# Patient Record
Sex: FEMALE | Race: WHITE | NOT HISPANIC OR LATINO | Employment: UNEMPLOYED | URBAN - METROPOLITAN AREA
[De-identification: names, ages, dates, MRNs, and addresses within clinical notes are randomized per-mention and may not be internally consistent; named-entity substitution may affect disease eponyms.]

---

## 2017-05-29 DIAGNOSIS — R05.9 COUGH: Primary | ICD-10-CM

## 2017-07-15 ENCOUNTER — OFFICE VISIT (OUTPATIENT)
Dept: URGENT CARE | Facility: CLINIC | Age: 62
End: 2017-07-15
Payer: COMMERCIAL

## 2017-07-15 VITALS
HEART RATE: 69 BPM | TEMPERATURE: 96 F | OXYGEN SATURATION: 99 % | SYSTOLIC BLOOD PRESSURE: 123 MMHG | RESPIRATION RATE: 18 BRPM | BODY MASS INDEX: 19.99 KG/M2 | WEIGHT: 120 LBS | HEIGHT: 65 IN | DIASTOLIC BLOOD PRESSURE: 74 MMHG

## 2017-07-15 DIAGNOSIS — L03.012 PARONYCHIA, LEFT: Primary | ICD-10-CM

## 2017-07-15 PROCEDURE — 99213 OFFICE O/P EST LOW 20 MIN: CPT | Mod: S$GLB,,, | Performed by: SURGERY

## 2017-07-15 RX ORDER — MUPIROCIN 20 MG/G
OINTMENT TOPICAL 3 TIMES DAILY
Qty: 1 TUBE | Refills: 0 | Status: SHIPPED | OUTPATIENT
Start: 2017-07-15

## 2017-07-15 NOTE — PROGRESS NOTES
Subjective:       Patient ID: Evie Bertrand is a 62 y.o. female.    Chief Complaint: Laceration (Patient states she cut her left thumb one week ago.)    Laceration    The incident occurred more than 1 week ago. The laceration is located on the left hand (thumb with injur to nail). The laceration mechanism was a clean knife. The pain is moderate. The pain has been worsening (worsening pain and nodule developing corner of nail and skin) since onset. It is unknown if a foreign body is present. Her tetanus status is UTD.     Review of Systems   Constitution: Negative for chills and decreased appetite.   Endocrine: Negative for cold intolerance, polydipsia and polyuria.   Skin: Positive for color change, dry skin, nail changes and poor wound healing.        Soaked nightly with peroxide and applied bandage   Musculoskeletal: Negative for arthritis and back pain.   Psychiatric/Behavioral: Negative for altered mental status, depression, memory loss and substance abuse. The patient does not have insomnia.        Objective:      Physical Exam   Constitutional: She is oriented to person, place, and time. She appears well-developed and well-nourished. She is cooperative.  Non-toxic appearance. She does not appear ill. No distress.   HENT:   Head: Normocephalic and atraumatic.   Right Ear: Hearing, tympanic membrane and ear canal normal.   Left Ear: Hearing, tympanic membrane and ear canal normal.   Nose: Nose normal. No mucosal edema, rhinorrhea or nasal deformity. No epistaxis. Right sinus exhibits no maxillary sinus tenderness and no frontal sinus tenderness. Left sinus exhibits no maxillary sinus tenderness and no frontal sinus tenderness.   Mouth/Throat: Uvula is midline and mucous membranes are normal. No trismus in the jaw. Normal dentition. No uvula swelling. No posterior oropharyngeal erythema.   Eyes: Conjunctivae and lids are normal. Right eye exhibits no discharge. Left eye exhibits no discharge. No scleral icterus.    Sclera clear bilat   Neck: Trachea normal, normal range of motion, full passive range of motion without pain and phonation normal. Neck supple.   Cardiovascular: Normal rate, regular rhythm, normal heart sounds, intact distal pulses and normal pulses.    Pulmonary/Chest: Effort normal. No respiratory distress.   Abdominal: Soft. Normal appearance. She exhibits no distension, no pulsatile midline mass and no mass. There is no tenderness.   Musculoskeletal: Normal range of motion. She exhibits no edema or deformity.   Neurological: She is alert and oriented to person, place, and time. She exhibits normal muscle tone. Coordination normal.   Skin: Skin is warm and dry. Laceration and rash noted. Rash is nodular. She is not diaphoretic. No cyanosis. No pallor.        Psychiatric: She has a normal mood and affect. Her speech is normal and behavior is normal. Judgment and thought content normal. Cognition and memory are normal.   Nursing note and vitals reviewed.      Assessment:       1. Paronychia, left        Plan:         Paronychia, left  -     mupirocin (BACTROBAN) 2 % ointment; Apply topically 3 (three) times daily.  Dispense: 1 Tube; Refill: 0

## 2017-07-15 NOTE — PATIENT INSTRUCTIONS
Paronychia of the Finger or Toe  Paronychia is an infection near a fingernail or toenail. It usually occurs when an opening in the cuticle or an ingrown toenail lets bacteria under the skin.  The infection will need to be drained if pus is present. If the infection has been caught early, you may need only antibiotic treatment. Healing will take about 1 to 2 weeks.  Home care  Follow these guidelines when caring for yourself at home:  · Clean and soak the toe or finger. Do this 2 times a day for the first 3 days. To do so:  ¨ Soak your foot or hand in a tub of warm water for 5 minutes. Or hold your toe or finger under a faucet of warm running water for 5 minutes.  ¨ Clean any crust away with soap and water using a cotton swab.  ¨ Put antibiotic ointment on the infected area.  · Change the dressing daily or any time it gets dirty.  · If you were given antibiotics, take them as directed until they are all gone.  · If your infection is on a toe, wear comfortable shoes with a lot of toe room. You can also wear open-toed sandals while your toe heals.  · You may use over-the-counter medicine (acetaminophen or ibuprofen to help with pain, unless another medicine was prescribed. If you have chronic liver or kidney disease, talk with your healthcare provider before using these medicines. Also talk with your provider if you've had a stomach ulcer or GI (gastrointestinal) bleeding.  Prevention  The following can prevent paronychia:  · Avoid cutting or playing with your cuticles at home.  · Don't bite your nails.  · Don't suck on your thumbs or fingers.  Follow-up care  Follow up with your healthcare provider, or as advised.  When to seek medical advice  Call your healthcare provider right away if any of these occur:  · Redness, pain, or swelling of the finger or toe gets worse  · Red streaks in the skin leading away from the wound  · Pus or fluid draining from the nail area  · Fever of 100.4ºF (38ºC) or higher, or as directed  by your provider  Date Last Reviewed: 8/1/2016  © 9784-1448 [employer] [address] [place] [address], [place] [address]. All rights reserved. This information is not intended as a substitute for professional medical care. Always follow your healthcare professional's instructions.

## 2021-04-16 ENCOUNTER — PATIENT MESSAGE (OUTPATIENT)
Dept: RESEARCH | Facility: HOSPITAL | Age: 66
End: 2021-04-16

## 2022-07-25 ENCOUNTER — OFFICE VISIT (OUTPATIENT)
Dept: URGENT CARE | Facility: CLINIC | Age: 67
End: 2022-07-25
Payer: COMMERCIAL

## 2022-07-25 VITALS
OXYGEN SATURATION: 98 % | DIASTOLIC BLOOD PRESSURE: 67 MMHG | BODY MASS INDEX: 20.33 KG/M2 | RESPIRATION RATE: 18 BRPM | WEIGHT: 122 LBS | HEART RATE: 89 BPM | SYSTOLIC BLOOD PRESSURE: 109 MMHG | HEIGHT: 65 IN

## 2022-07-25 DIAGNOSIS — S71.131A PUNCTURE WOUND OF RIGHT THIGH, INITIAL ENCOUNTER: Primary | ICD-10-CM

## 2022-07-25 DIAGNOSIS — L03.90 CELLULITIS, UNSPECIFIED CELLULITIS SITE: ICD-10-CM

## 2022-07-25 PROCEDURE — 3074F PR MOST RECENT SYSTOLIC BLOOD PRESSURE < 130 MM HG: ICD-10-PCS | Mod: CPTII,S$GLB,, | Performed by: SURGERY

## 2022-07-25 PROCEDURE — 1160F PR REVIEW ALL MEDS BY PRESCRIBER/CLIN PHARMACIST DOCUMENTED: ICD-10-PCS | Mod: CPTII,S$GLB,, | Performed by: SURGERY

## 2022-07-25 PROCEDURE — 1160F RVW MEDS BY RX/DR IN RCRD: CPT | Mod: CPTII,S$GLB,, | Performed by: SURGERY

## 2022-07-25 PROCEDURE — 3078F DIAST BP <80 MM HG: CPT | Mod: CPTII,S$GLB,, | Performed by: SURGERY

## 2022-07-25 PROCEDURE — 99214 PR OFFICE/OUTPT VISIT, EST, LEVL IV, 30-39 MIN: ICD-10-PCS | Mod: S$GLB,,, | Performed by: SURGERY

## 2022-07-25 PROCEDURE — 99214 OFFICE O/P EST MOD 30 MIN: CPT | Mod: S$GLB,,, | Performed by: SURGERY

## 2022-07-25 PROCEDURE — 3008F PR BODY MASS INDEX (BMI) DOCUMENTED: ICD-10-PCS | Mod: CPTII,S$GLB,, | Performed by: SURGERY

## 2022-07-25 PROCEDURE — 1159F MED LIST DOCD IN RCRD: CPT | Mod: CPTII,S$GLB,, | Performed by: SURGERY

## 2022-07-25 PROCEDURE — 1159F PR MEDICATION LIST DOCUMENTED IN MEDICAL RECORD: ICD-10-PCS | Mod: CPTII,S$GLB,, | Performed by: SURGERY

## 2022-07-25 PROCEDURE — 3078F PR MOST RECENT DIASTOLIC BLOOD PRESSURE < 80 MM HG: ICD-10-PCS | Mod: CPTII,S$GLB,, | Performed by: SURGERY

## 2022-07-25 PROCEDURE — 3074F SYST BP LT 130 MM HG: CPT | Mod: CPTII,S$GLB,, | Performed by: SURGERY

## 2022-07-25 PROCEDURE — 3008F BODY MASS INDEX DOCD: CPT | Mod: CPTII,S$GLB,, | Performed by: SURGERY

## 2022-07-25 RX ORDER — ALBUTEROL SULFATE 90 UG/1
2 AEROSOL, METERED RESPIRATORY (INHALATION) EVERY 6 HOURS PRN
COMMUNITY
Start: 2022-04-25

## 2022-07-25 RX ORDER — IBANDRONATE SODIUM 150 MG/1
TABLET, FILM COATED ORAL
COMMUNITY
Start: 2022-02-09

## 2022-07-25 RX ORDER — DOXYCYCLINE 100 MG/1
100 CAPSULE ORAL 2 TIMES DAILY
Qty: 14 CAPSULE | Refills: 1 | Status: SHIPPED | OUTPATIENT
Start: 2022-07-25 | End: 2022-08-01

## 2022-07-25 RX ORDER — MUPIROCIN 20 MG/G
OINTMENT TOPICAL DAILY
Qty: 15 G | Refills: 0 | Status: SHIPPED | OUTPATIENT
Start: 2022-07-25

## 2022-07-25 RX ORDER — LORAZEPAM 1 MG/1
1 TABLET ORAL EVERY 6 HOURS PRN
COMMUNITY
Start: 2022-01-03

## 2022-07-25 NOTE — PROGRESS NOTES
"Subjective:       Patient ID: Evie Bertrand is a 67 y.o. female.    Vitals:  height is 5' 5" (1.651 m) and weight is 55.3 kg (122 lb). Her blood pressure is 109/67 and her pulse is 89. Her respiration is 18 and oxygen saturation is 98%.     Chief Complaint: Puncture Wound    Pt has c/o thorn puncture wound last week. Pt applied mupirocin for sx relief.     Rash  This is a new problem. The current episode started in the past 7 days. The problem is unchanged. The affected locations include the right upper leg. She was exposed to plant contact. Pertinent negatives include no anorexia, congestion, cough, diarrhea, eye pain, facial edema, fatigue, fever, joint pain, nail changes, rhinorrhea, shortness of breath, sore throat or vomiting. Past treatments include antibiotic cream.       Constitution: Negative for fatigue and fever.   HENT: Negative for congestion and sore throat.    Eyes: Negative for eye pain.   Respiratory: Negative for cough and shortness of breath.    Gastrointestinal: Negative for vomiting and diarrhea.   Skin: Positive for rash. Negative for erythema.       Objective:      Physical Exam   Constitutional: She is oriented to person, place, and time. She appears well-developed.   HENT:   Head: Normocephalic and atraumatic. Head is without abrasion, without contusion and without laceration.   Ears:   Right Ear: External ear normal.   Left Ear: External ear normal.   Nose: Nose normal.   Mouth/Throat: Oropharynx is clear and moist and mucous membranes are normal.   Eyes: Conjunctivae, EOM and lids are normal. Pupils are equal, round, and reactive to light.   Neck: Trachea normal and phonation normal. Neck supple.   Cardiovascular: Normal rate, regular rhythm and normal heart sounds.   Pulmonary/Chest: Effort normal and breath sounds normal. No stridor. No respiratory distress.   Musculoskeletal: Normal range of motion.         General: Normal range of motion.   Neurological: She is alert and oriented to " person, place, and time.   Skin: Skin is warm, dry, intact and no rash. Capillary refill takes less than 2 seconds. No abrasion, No burn, No bruising, No erythema and No ecchymosis        Psychiatric: Her speech is normal and behavior is normal. Judgment and thought content normal.   Nursing note and vitals reviewed.        Assessment:       1. Puncture wound of right thigh, initial encounter    2. Cellulitis, unspecified cellulitis site          Plan:         Puncture wound of right thigh, initial encounter  -     doxycycline (VIBRAMYCIN) 100 MG Cap; Take 1 capsule (100 mg total) by mouth 2 (two) times daily. for 7 days  Dispense: 14 capsule; Refill: 1  -     mupirocin (BACTROBAN) 2 % ointment; Apply topically once daily.  Dispense: 15 g; Refill: 0    Cellulitis, unspecified cellulitis site

## 2023-02-16 ENCOUNTER — HOSPITAL ENCOUNTER (EMERGENCY)
Facility: HOSPITAL | Age: 68
Discharge: HOME | End: 2023-02-16
Attending: EMERGENCY MEDICINE
Payer: COMMERCIAL

## 2023-02-16 VITALS
HEART RATE: 69 BPM | RESPIRATION RATE: 18 BRPM | TEMPERATURE: 98 F | SYSTOLIC BLOOD PRESSURE: 147 MMHG | OXYGEN SATURATION: 96 % | DIASTOLIC BLOOD PRESSURE: 67 MMHG

## 2023-02-16 DIAGNOSIS — H10.219 CHEMICAL CONJUNCTIVITIS, UNSPECIFIED LATERALITY: Primary | ICD-10-CM

## 2023-02-16 PROCEDURE — 99284 EMERGENCY DEPT VISIT MOD MDM: CPT | Mod: 25

## 2023-02-16 PROCEDURE — 25000003 PHARM REV CODE 250: Performed by: EMERGENCY MEDICINE

## 2023-02-16 PROCEDURE — 99283 PR EMERGENCY DEPT VISIT,LEVEL III: ICD-10-PCS | Mod: ,,, | Performed by: EMERGENCY MEDICINE

## 2023-02-16 PROCEDURE — 99283 EMERGENCY DEPT VISIT LOW MDM: CPT | Mod: ,,, | Performed by: EMERGENCY MEDICINE

## 2023-02-16 RX ORDER — HYDROCODONE BITARTRATE AND ACETAMINOPHEN 5; 325 MG/1; MG/1
1 TABLET ORAL EVERY 6 HOURS PRN
Qty: 12 TABLET | Refills: 0 | Status: SHIPPED | OUTPATIENT
Start: 2023-02-16

## 2023-02-16 RX ORDER — ERYTHROMYCIN 5 MG/G
OINTMENT OPHTHALMIC
Status: COMPLETED | OUTPATIENT
Start: 2023-02-16 | End: 2023-02-16

## 2023-02-16 RX ORDER — PROPARACAINE HYDROCHLORIDE 5 MG/ML
1 SOLUTION/ DROPS OPHTHALMIC
Status: COMPLETED | OUTPATIENT
Start: 2023-02-16 | End: 2023-02-16

## 2023-02-16 RX ORDER — ERYTHROMYCIN 5 MG/G
OINTMENT OPHTHALMIC
Qty: 3.5 G | Refills: 0 | Status: SHIPPED | OUTPATIENT
Start: 2023-02-16

## 2023-02-16 RX ADMIN — FLUORESCEIN SODIUM 1 EACH: 1 STRIP OPHTHALMIC at 02:02

## 2023-02-16 RX ADMIN — PROPARACAINE HYDROCHLORIDE 1 DROP: 5 SOLUTION/ DROPS OPHTHALMIC at 02:02

## 2023-02-16 RX ADMIN — ERYTHROMYCIN 1 INCH: 5 OINTMENT OPHTHALMIC at 03:02

## 2023-02-16 NOTE — ED TRIAGE NOTES
Evie Bertrand, a 67 y.o. female presents to the ED w/ complaint of R eye pain after accidentally putting clear nail polish in her eye instead of eye drops. States rinsed eye out twice with no relief. Denies any vision changes    Triage note:  Chief Complaint   Patient presents with    Eye Pain     States accidentally squirted clear nail polish into R eye instead of eye drops. States has rinsed eye out multiple times but is still burning. Denies vision changes     Review of patient's allergies indicates:  No Known Allergies  Past Medical History:   Diagnosis Date    Allergy

## 2023-02-16 NOTE — ED PROVIDER NOTES
Source of History:  Patient  Chart    Chief complaint:  Eye Pain (States accidentally squirted clear nail polish into R eye instead of eye drops. States has rinsed eye out multiple times but is still burning. Denies vision changes)      HPI:  Evie Bertrand is a 67 y.o. female presenting to emergency department with complaint of right eye pain.  Patient states that she accidentally escorted clear nail Polish into her right eye instead of the intended eyedrops shortly prior to arrival.  She immediately went to the bathroom and rinsed her eye but had severe pain and burning.  There were no vision changes including blurry vision or double vision.  She does wear glasses, does not wear contacts at this time.  She complains of pain in the right eye.  No pain elsewhere.        Review of patient's allergies indicates:  No Known Allergies    No current facility-administered medications on file prior to encounter.     Current Outpatient Medications on File Prior to Encounter   Medication Sig Dispense Refill    albuterol (PROVENTIL/VENTOLIN HFA) 90 mcg/actuation inhaler Inhale 2 puffs into the lungs every 6 (six) hours as needed.      glucosamine & chondroit sul.Na 750-600 mg Tab Take 1 tablet by mouth 2 (two) times daily. 60 tablet 2    ibandronate (BONIVA) 150 mg tablet SMARTSI Tablet(s) By Mouth      LORazepam (ATIVAN) 1 MG tablet Take 1 tablet by mouth every 6 (six) hours as needed.      mupirocin (BACTROBAN) 2 % ointment Apply topically 3 (three) times daily. 1 Tube 0    mupirocin (BACTROBAN) 2 % ointment Apply topically once daily. 15 g 0       PMH:  As per HPI and below:  Past Medical History:   Diagnosis Date    Allergy      No past surgical history on file.    Social History     Socioeconomic History    Marital status:    Tobacco Use    Smoking status: Never    Smokeless tobacco: Never   Substance and Sexual Activity    Alcohol use: Yes       Family History   Problem Relation Age of Onset    Diabetes Mother      Hypertension Mother     Cancer Father        Physical Exam:      Vitals:    02/16/23 0202   BP: (!) 147/67   Pulse: 69   Resp: 18   Temp: 97.5 °F (36.4 °C)     Atraumatic eye with injected conjunctiva. No hyphema. No hypopion.  Tearing present.  PERRL, EOMI.   No foreign objects on eversion of L eyelids.  No corneal abrasion or ulceration.    PH 7.   No surrounding skin change.    Laboratory Studies:  Labs Reviewed - No data to display    Chart reviewed.     Imaging Results    None         Medications Given:  Medications   fluorescein ophthalmic strip 1 each (1 each Left Eye Given 2/16/23 0249)   proparacaine 0.5 % ophthalmic solution 1 drop (1 drop Left Eye Given 2/16/23 0249)   erythromycin 5 mg/gram (0.5 %) ophthalmic ointment (1 inch Right Eye Given 2/16/23 0328)       MDM:    67 y.o. female with chemical conjunctivitis after accidentally putting nail Polish into her eye.  She already vigorously irrigated her eye at home and has a normal pH here.  No corneal abrasion or ulceration.  No foreign bodies noted.  Aside from conjunctival injection, her exam is reassuring.  Will prescribe short course of pain medication,  erythromycin eye ointment.  Plan follow-up with her ophthalmologist if she has ongoing issues.  Return precautions discussed at bedside.    Diagnostic Impression:    1. Chemical conjunctivitis, unspecified laterality         ED Disposition Condition    Discharge Stable          ED Prescriptions       Medication Sig Dispense Start Date End Date Auth. Provider    HYDROcodone-acetaminophen (NORCO) 5-325 mg per tablet Take 1 tablet by mouth every 6 (six) hours as needed for Pain. 12 tablet 2/16/2023 -- Roseanna Mei MD    erythromycin (ROMYCIN) ophthalmic ointment Place a 1/2 inch ribbon of ointment into the lower eyelid. 3.5 g 2/16/2023 -- Roseanna Mei MD          Follow-up Information       Follow up With Specialties Details Why Contact Info Additional Information    Bello Yanez,  MD Internal Medicine Schedule an appointment as soon as possible for a visit   [address] [facility]  4th Floor  [facility] [address]  [phone]       [address] Ophthalmology Schedule an appointment as soon as possible for a visit   [address]  [facility] [address] [phone] Please arrive on the 10th floor for check-in.            Patient  understands the plan and is in agreement, verbalized understanding, questions answered    Roseanna Mei MD  Emergency Medicine         Roseanna Mei MD  02/16/23 4334

## 2023-06-29 ENCOUNTER — OFFICE VISIT (OUTPATIENT)
Dept: URGENT CARE | Facility: CLINIC | Age: 68
End: 2023-06-29
Payer: COMMERCIAL

## 2023-06-29 VITALS
HEIGHT: 65 IN | RESPIRATION RATE: 18 BRPM | TEMPERATURE: 99 F | WEIGHT: 122 LBS | DIASTOLIC BLOOD PRESSURE: 65 MMHG | HEART RATE: 79 BPM | BODY MASS INDEX: 20.33 KG/M2 | SYSTOLIC BLOOD PRESSURE: 138 MMHG | OXYGEN SATURATION: 99 %

## 2023-06-29 DIAGNOSIS — R09.81 NASAL CONGESTION: ICD-10-CM

## 2023-06-29 DIAGNOSIS — G44.319 ACUTE POST-TRAUMATIC HEADACHE, NOT INTRACTABLE: ICD-10-CM

## 2023-06-29 DIAGNOSIS — R53.83 FATIGUE, UNSPECIFIED TYPE: ICD-10-CM

## 2023-06-29 DIAGNOSIS — S09.90XA INJURY OF HEAD, INITIAL ENCOUNTER: Primary | ICD-10-CM

## 2023-06-29 DIAGNOSIS — U07.1 COVID-19 VIRUS INFECTION: ICD-10-CM

## 2023-06-29 PROBLEM — M81.0 OSTEOPOROSIS: Status: ACTIVE | Noted: 2019-03-12

## 2023-06-29 LAB
CTP QC/QA: YES
SARS-COV-2 AG RESP QL IA.RAPID: POSITIVE

## 2023-06-29 PROCEDURE — 99213 OFFICE O/P EST LOW 20 MIN: CPT | Mod: S$GLB,,, | Performed by: NURSE PRACTITIONER

## 2023-06-29 PROCEDURE — 70260 XR SKULL COMPLETE MIN 4 VIEWS: ICD-10-PCS | Mod: S$GLB,,, | Performed by: RADIOLOGY

## 2023-06-29 PROCEDURE — 87811 SARS CORONAVIRUS 2 ANTIGEN POCT, MANUAL READ: ICD-10-PCS | Mod: QW,S$GLB,, | Performed by: NURSE PRACTITIONER

## 2023-06-29 PROCEDURE — 99213 PR OFFICE/OUTPT VISIT, EST, LEVL III, 20-29 MIN: ICD-10-PCS | Mod: S$GLB,,, | Performed by: NURSE PRACTITIONER

## 2023-06-29 PROCEDURE — 87811 SARS-COV-2 COVID19 W/OPTIC: CPT | Mod: QW,S$GLB,, | Performed by: NURSE PRACTITIONER

## 2023-06-29 PROCEDURE — 70260 X-RAY EXAM OF SKULL: CPT | Mod: S$GLB,,, | Performed by: RADIOLOGY

## 2023-06-29 RX ORDER — ACETAMINOPHEN 500 MG
500 TABLET ORAL
Status: COMPLETED | OUTPATIENT
Start: 2023-06-29 | End: 2023-06-29

## 2023-06-29 RX ORDER — CYANOCOBALAMIN (VITAMIN B-12) 500 MCG
TABLET ORAL
COMMUNITY

## 2023-06-29 RX ORDER — TRIAMCINOLONE ACETONIDE 1 MG/G
CREAM TOPICAL
COMMUNITY
Start: 2023-03-02

## 2023-06-29 RX ADMIN — Medication 500 MG: at 03:06

## 2023-06-29 NOTE — PATIENT INSTRUCTIONS
Go to the Emergency Department for any concerns or worsening of condition.     For the first 12 to 24 hours after you are home, have an adult watch you. They should call the doctor if you have any problems. It is important to make sure you are breathing normally, not throwing up, and not moaning while you sleep.  Rest your body. Do not work out. You should not use exercise machines such as treadmills, or do other heavy activities. Light activity is OK.  Rest your brain. Stay away from doing things that need a lot of thought or focus. Stay away from TV, computers, and video games. Check with your doctor to see when you can return to these things.  Be as comfortable as possible. Place an ice pack or a bag of frozen peas wrapped in a towel over the painful part. Never put ice directly on the skin. Do not leave the ice on more than 10 to 15 minutes at a time.    If you do not have Hypertension or any history of palpitations, it is ok to take over the counter Sudafed or Mucinex D or Allegra-D or Claritin-D or Zyrtec-D.  Or plain Allegra or Claritin or Zyrtec.   If you do have Hypertension or palpitations, it is safe to take Coricidin HBP for relief of sinus symptoms.  If not allergic, please take over the counter Tylenol (Acetaminophen) and/or Motrin (Ibuprofen) as directed for control of pain and/or fever.  Please follow up with your primary care doctor or specialist as needed.    If you  smoke, please stop smoking.   POSITIVE COVID TEST       You have tested positive for COVID-19 today.  Please note that patients who test positive for COVID-19 are required by the CDC to undergo isolation for 5 days after their symptoms first began.       This isolation starts from the day you first developed symptoms, not the day of your positive test. For example, if your symptoms began on a Monday but tested positive on the following Wednesday, your 5-day isolation begins from that Monday, not the Wednesday you tested positive.        However, if you are asymptomatic (a person who does not have any symptoms) and COVID-19 positive, your 5-day isolation begins on the day you tested positive, regardless of exposure date.       Also, per the CDC guidelines, once your 5 days have passed, and you have not had fever greater than 100.4F in the last 24 hours without taking any fever reducers such as Tylenol (Acetaminophen) or Motrin (Ibuprofen), you may return to your normal activities including social distancing, wearing masks (continually for 5 more days), and frequent handwashing - YOU DO NOT NEED ANOTHER TEST IN ORDER TO END YOUR QUARANTINE.           Please follow up with your primary care doctor in 1-2 days or specialist.

## 2023-06-29 NOTE — PROGRESS NOTES
"Subjective:      Patient ID: Evie Bertrand is a 68 y.o. female.    Vitals:  height is 5' 5" (1.651 m) and weight is 55.3 kg (122 lb). Her temperature is 98.9 °F (37.2 °C). Her blood pressure is 138/65 and her pulse is 79. Her respiration is 18 and oxygen saturation is 99%.     Chief Complaint: No chief complaint on file.    Patient reports that she hit her head on a window yesterday and has been feeling dizzy and nauseas since it happened. Patient reports she feels stuffed up and reports that she did not sleep well and her head is really bothering her now.     68 year old female presents to clinic requesting an exam after hitting the top of her head on a window with wood frame while attempting to walk underneath yesterday around 10am, approximately 29 hours ago. She placed ice to the top of her head, no abrasion or laceration noted. At time of impact she felt dizzy, sat down then felt okay. Denies loss of consciousness, nausea, vomiting, confusion, trouble walking, slurred speech, trouble sleeping, vision changes, mood changes, light or noise sensitivity, jerky movement.   She later walked a few blocks without problems, had her spouse pick her up and returned home feeling well enough to drive and run a few errands. Later that day she felt tired and had an episode of nasal congestion. History of sinus infection. She reports usually very active and is now feeling fatigue, headache rated at an 8 on a scale 0-10 and nasal congestion treating with mucinex. History positive for Moderna covid vaccine x 3 doses 2/12/21, 3/12/21,12/23/21    Trauma  The injury mechanism was a direct blow. The pain is mild. Associated symptoms include fussiness and headaches. Pertinent negatives include no light-headedness, loss of consciousness, nausea, numbness, seizures or weakness. There have been no prior injuries to these areas.     Constitution: Positive for fatigue. Negative for chills and sweating.   HENT:  Positive for congestion.  "   Gastrointestinal:  Negative for nausea.   Skin:  Negative for abrasion.   Neurological:  Positive for headaches. Negative for dizziness, history of vertigo, light-headedness, passing out, facial drooping, speech difficulty, coordination disturbances, loss of balance, disorientation, altered mental status, loss of consciousness, numbness, tingling, seizures and tremors.   Psychiatric/Behavioral:  Negative for altered mental status, disorientation, confusion, agitation, nervous/anxious and sleep disturbance. The patient is not nervous/anxious.     Objective:     Physical Exam   Constitutional: She is oriented to person, place, and time. She appears well-developed. She is cooperative.  Non-toxic appearance. She does not appear ill. No distress. well-groomed  HENT:   Head: Normocephalic and atraumatic.   Ears:   Right Ear: Hearing, tympanic membrane, external ear and ear canal normal.   Left Ear: Hearing, tympanic membrane, external ear and ear canal normal.   Nose: Mucosal edema and rhinorrhea present. No nasal deformity. No epistaxis. Right sinus exhibits no maxillary sinus tenderness and no frontal sinus tenderness. Left sinus exhibits no maxillary sinus tenderness and no frontal sinus tenderness.   Mouth/Throat: Uvula is midline, oropharynx is clear and moist and mucous membranes are normal. No trismus in the jaw. Normal dentition. No uvula swelling. No posterior oropharyngeal erythema.   Eyes: Conjunctivae, EOM and lids are normal. Pupils are equal, round, and reactive to light. No scleral icterus. Extraocular movement intact gaze aligned appropriately   Neck: Trachea normal and phonation normal. Neck supple. No neck rigidity present.   Cardiovascular: Normal rate, regular rhythm and normal heart sounds.   Pulmonary/Chest: Effort normal and breath sounds normal. No respiratory distress.   Abdominal: Normal appearance and bowel sounds are normal. She exhibits no distension. Soft. There is no abdominal tenderness.    Musculoskeletal: Normal range of motion.         General: No deformity. Normal range of motion.   Neurological: She is alert and oriented to person, place, and time. She has normal motor skills and normal sensation. No cranial nerve deficit. She exhibits normal muscle tone. Gait and coordination normal. Coordination normal.   Skin: Skin is warm, dry, intact, not diaphoretic and not pale.   Psychiatric: Her speech is normal and behavior is normal. Judgment and thought content normal.   Nursing note and vitals reviewed.    Assessment:     1. Injury of head, initial encounter    2. COVID-19 virus infection    3. Fatigue, unspecified type    4. Nasal congestion    5. Acute post-traumatic headache, not intractable         Results for orders placed or performed in visit on 06/29/23   SARS Coronavirus 2 Antigen, POCT Manual Read   Result Value Ref Range    SARS Coronavirus 2 Antigen Positive (A) Negative     Acceptable Yes       Plan:       Injury of head, initial encounter  -     X-Ray Skull Complete Min 4 Views; Future; Expected date: 06/29/2023    COVID-19 virus infection    Fatigue, unspecified type  -     SARS Coronavirus 2 Antigen, POCT Manual Read    Nasal congestion  -     SARS Coronavirus 2 Antigen, POCT Manual Read    Acute post-traumatic headache, not intractable  -     acetaminophen tablet 500 mg      1 covid risk score, paxlovid patient fact sheet provided          X-Ray Skull Complete Min 4 Views    Result Date: 6/29/2023  EXAMINATION: XR SKULL COMPLETE MIN 4 VIEWS CLINICAL HISTORY: Unspecified injury of head, initial encounter TECHNIQUE: Multiple views of the skull submitted COMPARISON: None FINDINGS: Paranasal sinuses as visualized are fairly well aerated.  Probable mucosal membrane thickening in the maxillary antra bilaterally.  No depressed skull fracture seen.     Probable mucosal membrane thickening in the maxillary antra though not optimally visualized.  No depressed skull fracture  seen.  CT head if clinically indicated. Electronically signed by: Cesar Velasco MD Date:    06/29/2023 Time:    16:20      Reviewed xray with patient who acknowledged results.  Discussed  Diagnosis and treatment plan with patient who verbalized understanding and agrees with plan of care.  Advised on the need to call and schedule a follow-up appointment with pcp to discuss treatment for covid and CTscan Patient given educational handouts supporting this diagnosis as well. Patient denies any further questions or concerns at this time.  Patient exits exam room in no acute distress.   Patient Instructions   Go to the Emergency Department for any concerns or worsening of condition.     For the first 12 to 24 hours after you are home, have an adult watch you. They should call the doctor if you have any problems. It is important to make sure you are breathing normally, not throwing up, and not moaning while you sleep.  Rest your body. Do not work out. You should not use exercise machines such as treadmills, or do other heavy activities. Light activity is OK.  Rest your brain. Stay away from doing things that need a lot of thought or focus. Stay away from TV, computers, and video games. Check with your doctor to see when you can return to these things.  Be as comfortable as possible. Place an ice pack or a bag of frozen peas wrapped in a towel over the painful part. Never put ice directly on the skin. Do not leave the ice on more than 10 to 15 minutes at a time.    If you do not have Hypertension or any history of palpitations, it is ok to take over the counter Sudafed or Mucinex D or Allegra-D or Claritin-D or Zyrtec-D.  Or plain Allegra or Claritin or Zyrtec.   If you do have Hypertension or palpitations, it is safe to take Coricidin HBP for relief of sinus symptoms.  If not allergic, please take over the counter Tylenol (Acetaminophen) and/or Motrin (Ibuprofen) as directed for control of pain and/or fever.  Please follow  up with your primary care doctor or specialist as needed.    If you  smoke, please stop smoking.   POSITIVE COVID TEST       You have tested positive for COVID-19 today.  Please note that patients who test positive for COVID-19 are required by the CDC to undergo isolation for 5 days after their symptoms first began.       This isolation starts from the day you first developed symptoms, not the day of your positive test. For example, if your symptoms began on a Monday but tested positive on the following Wednesday, your 5-day isolation begins from that Monday, not the Wednesday you tested positive.       However, if you are asymptomatic (a person who does not have any symptoms) and COVID-19 positive, your 5-day isolation begins on the day you tested positive, regardless of exposure date.       Also, per the CDC guidelines, once your 5 days have passed, and you have not had fever greater than 100.4F in the last 24 hours without taking any fever reducers such as Tylenol (Acetaminophen) or Motrin (Ibuprofen), you may return to your normal activities including social distancing, wearing masks (continually for 5 more days), and frequent handwashing - YOU DO NOT NEED ANOTHER TEST IN ORDER TO END YOUR QUARANTINE.           Please follow up with your primary care doctor in 1-2 days or specialist.

## 2024-10-11 NOTE — PROGRESS NOTES
"CRS Office Visit History and Physical    Referring Md:   Self, Aaarefmartin  No address on file    SUBJECTIVE:     Chief Complaint: ED follow up    History of Present Illness:  The patient is a new patient to this practice.   Course is as follows:  Evie Bertrand is a 69 y.o. female presents after recent ED visit for diverticulitis.  Reports that she started to have abdominal distention and pain earlier in the week.  Presented to the ED where she was diagnosed with acute uncomplicated diverticulitis.  Discharged from ED with cipro/flagyl.  Reports that since then she is tolerating a diet.  Having some irregular bowel function.  Last colonoscopy in 2019.  She has a family history significant for colitis and diverticulitis in several relatives and colon cancer in her father.      Last Colonoscopy: 2019    Review of patient's allergies indicates:  No Known Allergies    Past Medical History:   Diagnosis Date    Allergy      No past surgical history on file.  Family History   Problem Relation Name Age of Onset    Diabetes Mother      Hypertension Mother      Cancer Father       Social History     Tobacco Use    Smoking status: Never    Smokeless tobacco: Never   Substance Use Topics    Alcohol use: Yes        Review of Systems:  Review of Systems   All other systems reviewed and are negative.      OBJECTIVE:     Vital Signs (Most Recent)  BP (!) 150/71   Pulse 70   Ht 5' 5" (1.651 m)   Wt 56.5 kg (124 lb 9 oz)   BMI 20.73 kg/m²     Physical Exam:  General: 69 y.o. female in no distress   Neuro: alert and oriented x 4.  Moves all extremities.     HEENT: normocephalic, atraumatic, PERRL, EOMI   Respiratory: respirations are even and unlabored  Cardiac: regular rate and rhythm  Abdomen: soft, NTND   Extremities: Warm dry and intact  Skin: no rashes      Labs: NA    Imagin. A few diverticula are seen in the sigmoid colon. There is associated moderate wall thickening and pericolonic fat stranding in the sigmoid colon " as seen on Series 2, Images 100-105. Minimal pericolic and pelvic free fluid is also observed. This is consistent with acute diverticulitis. No organized collection or extraluminal air is seen to suggest abscess or perforation. Correlate clinically as regards additional evaluation and follow up to resolution is recommended.   2. Details and other findings as discussed above.     This preliminary report was electronically signed by: Darien Ty   Signature Date/Time: 10/09/2024 21:53:56       ASSESSMENT/PLAN:     Evie was seen today for abdominal pain and diverticulitis.    Diagnoses and all orders for this visit:    Acute diverticulitis        69 y.o. female with acute uncomplicated diverticulitis     - imaging reports reviewed, will request images for review from [facility]  - We reviewed the etiology of diverticulosis and diverticulitis.  We reviewed expected recovery, risk of recurrence and indications for surgical intervention.  I would not recommend surgery at this time.   - We discussed rationale for follow up colonoscopy.  Patient is due for screening colonoscopy. Message sent to endoscopy scheduling   - follow up in 1 month to ensure resolution of symptoms     Tyra Davila MD  Staff Surgeon  Colon & Rectal Surgery

## 2024-10-14 ENCOUNTER — OFFICE VISIT (OUTPATIENT)
Dept: SURGERY | Facility: CLINIC | Age: 69
End: 2024-10-14
Payer: COMMERCIAL

## 2024-10-14 VITALS
DIASTOLIC BLOOD PRESSURE: 71 MMHG | BODY MASS INDEX: 20.75 KG/M2 | WEIGHT: 124.56 LBS | HEIGHT: 65 IN | HEART RATE: 70 BPM | SYSTOLIC BLOOD PRESSURE: 150 MMHG

## 2024-10-14 DIAGNOSIS — K57.92 ACUTE DIVERTICULITIS: Primary | ICD-10-CM

## 2024-10-14 PROCEDURE — 1125F AMNT PAIN NOTED PAIN PRSNT: CPT | Mod: CPTII,S$GLB,, | Performed by: SURGERY

## 2024-10-14 PROCEDURE — 1159F MED LIST DOCD IN RCRD: CPT | Mod: CPTII,S$GLB,, | Performed by: SURGERY

## 2024-10-14 PROCEDURE — 99203 OFFICE O/P NEW LOW 30 MIN: CPT | Mod: S$GLB,,, | Performed by: SURGERY

## 2024-10-14 PROCEDURE — 3288F FALL RISK ASSESSMENT DOCD: CPT | Mod: CPTII,S$GLB,, | Performed by: SURGERY

## 2024-10-14 PROCEDURE — 3077F SYST BP >= 140 MM HG: CPT | Mod: CPTII,S$GLB,, | Performed by: SURGERY

## 2024-10-14 PROCEDURE — 99999 PR PBB SHADOW E&M-EST. PATIENT-LVL III: CPT | Mod: PBBFAC,,, | Performed by: SURGERY

## 2024-10-14 PROCEDURE — 1101F PT FALLS ASSESS-DOCD LE1/YR: CPT | Mod: CPTII,S$GLB,, | Performed by: SURGERY

## 2024-10-14 PROCEDURE — 3008F BODY MASS INDEX DOCD: CPT | Mod: CPTII,S$GLB,, | Performed by: SURGERY

## 2024-10-14 PROCEDURE — 3078F DIAST BP <80 MM HG: CPT | Mod: CPTII,S$GLB,, | Performed by: SURGERY

## 2024-10-14 RX ORDER — OXYCODONE AND ACETAMINOPHEN 5; 325 MG/1; MG/1
1 TABLET ORAL EVERY 4 HOURS PRN
COMMUNITY

## 2024-10-14 RX ORDER — ONDANSETRON 4 MG/1
4 TABLET, ORALLY DISINTEGRATING ORAL EVERY 6 HOURS PRN
COMMUNITY

## 2024-10-14 RX ORDER — CIPROFLOXACIN 500 MG/1
500 TABLET ORAL EVERY 12 HOURS
COMMUNITY

## 2024-10-14 RX ORDER — METRONIDAZOLE 500 MG/1
500 TABLET ORAL 3 TIMES DAILY
COMMUNITY

## 2024-10-15 ENCOUNTER — PATIENT MESSAGE (OUTPATIENT)
Dept: RESEARCH | Facility: HOSPITAL | Age: 69
End: 2024-10-15
Payer: COMMERCIAL

## 2024-10-16 ENCOUNTER — TELEPHONE (OUTPATIENT)
Dept: ENDOSCOPY | Facility: HOSPITAL | Age: 69
End: 2024-10-16
Payer: COMMERCIAL

## 2024-10-16 NOTE — TELEPHONE ENCOUNTER
"Contacted the patient to schedule an endoscopy procedure(s) Colonoscopy. The patient did not answer the call and left a voice message requesting a call back.     Honey Baumann Jennifer B, RN  Caller: Unspecified (2 days ago,  9:40 AM)         Previous Messages       ----- Message -----  From: Tyra Davila MD  Sent: 10/14/2024   9:41 AM CDT  To: [facility]    Procedure: Colonoscopy    Diagnosis: Screening colonoscopy    Procedure Timin-12 weeks    *If within 4 weeks selected, please marianna as high priority*    *If greater than 12 weeks, please select "5-12 weeks" and delay sending until 3 months prior to requested date*    Location: Any Site    Additional Scheduling Information: No scheduling concerns    Prep Specifications:Standard prep    Is the patient taking a GLP-1 Agonist:no    Have you attached a patient to this message: yes    "

## 2024-10-22 ENCOUNTER — TELEPHONE (OUTPATIENT)
Dept: ENDOSCOPY | Facility: HOSPITAL | Age: 69
End: 2024-10-22
Payer: COMMERCIAL

## 2024-11-07 ENCOUNTER — TELEPHONE (OUTPATIENT)
Dept: SURGERY | Facility: CLINIC | Age: 69
End: 2024-11-07
Payer: COMMERCIAL

## 2024-11-07 NOTE — TELEPHONE ENCOUNTER
----- Message from JON Barnes sent at 11/7/2024  1:21 PM CST -----  Regarding: FW: cancel appt  Contact: Pt @[phone]    ----- Message -----  From: Deedee Petit  Sent: 11/7/2024  12:10 PM CST  To: [facility] Staff  Subject: cancel appt                                      Pt is calling to speak to someone in the office to r/s her appt that she is currently scheduled for; no available appts in Epic. Please call to advise. Thanks.

## 2024-11-07 NOTE — TELEPHONE ENCOUNTER
Spoke with patient, informed her that the soonest available for Dr Davila is 12/6. Patient is agreeable to that date. Appt scheduled.

## 2024-11-15 DIAGNOSIS — G43.709 CHRONIC MIGRAINE WITHOUT AURA, NOT INTRACTABLE, WITHOUT STATUS MIGRAINOSUS: Primary | ICD-10-CM

## 2024-12-03 ENCOUNTER — TELEPHONE (OUTPATIENT)
Dept: SURGERY | Facility: CLINIC | Age: 69
End: 2024-12-03
Payer: COMMERCIAL

## 2024-12-03 NOTE — TELEPHONE ENCOUNTER
Spoke with pt regarding request to schedule colonoscopy. Informed that per provider's last clinic note, she has sent a message to endoscopy department to schedule for procedure. Advised that it may take up to 12 weeks to be scheduled. Provided with phone number to endoscopy scheduling. Advised to request Dr. Davila at that time. Pt was scheduled for follow up with Dr. Davila on 12/6 but cancelled as she does not want additional hospital charges if her insurance will not cover. Advised that appt on 12/6 was a follow up and not procedure. Pt declines wanting to be rescheduled; will wait until colonoscopy to be seen. Pt aware that timing may not be soon be would like this year. Pt is also requesting colonoscopy for  but will reach out to PCP for referral. Pt denies further questions at this time.         ----- Message from Meredith sent at 12/3/2024  8:25 AM CST -----  Regarding: Scheduling a colonoscopy  Contact: [phone]  Type:  Needs Medical Advice    Who Called: Evie  Wanting to speak with someone on scheduling referral for a colonoscopy  She is aware she needs a referral but still wanting to speak with someone  Would the patient rather a call back or a response via [facility]? Call back  Best Call Back Number: [phone]    Additional Information:

## 2024-12-09 ENCOUNTER — LAB VISIT (OUTPATIENT)
Dept: LAB | Facility: HOSPITAL | Age: 69
End: 2024-12-09
Attending: STUDENT IN AN ORGANIZED HEALTH CARE EDUCATION/TRAINING PROGRAM
Payer: COMMERCIAL

## 2024-12-09 ENCOUNTER — OFFICE VISIT (OUTPATIENT)
Dept: INTERNAL MEDICINE | Facility: CLINIC | Age: 69
End: 2024-12-09
Payer: COMMERCIAL

## 2024-12-09 VITALS
HEIGHT: 65 IN | HEART RATE: 68 BPM | BODY MASS INDEX: 20.17 KG/M2 | SYSTOLIC BLOOD PRESSURE: 124 MMHG | WEIGHT: 121.06 LBS | DIASTOLIC BLOOD PRESSURE: 72 MMHG

## 2024-12-09 DIAGNOSIS — K21.9 GASTROESOPHAGEAL REFLUX DISEASE, UNSPECIFIED WHETHER ESOPHAGITIS PRESENT: ICD-10-CM

## 2024-12-09 DIAGNOSIS — M81.0 AGE-RELATED OSTEOPOROSIS WITHOUT CURRENT PATHOLOGICAL FRACTURE: ICD-10-CM

## 2024-12-09 DIAGNOSIS — Z87.19 HISTORY OF DIVERTICULITIS: ICD-10-CM

## 2024-12-09 DIAGNOSIS — F41.1 GENERALIZED ANXIETY DISORDER: ICD-10-CM

## 2024-12-09 DIAGNOSIS — G43.709 CHRONIC MIGRAINE WITHOUT AURA, NOT INTRACTABLE, WITHOUT STATUS MIGRAINOSUS: ICD-10-CM

## 2024-12-09 DIAGNOSIS — Z00.00 ENCOUNTER FOR MEDICAL EXAMINATION TO ESTABLISH CARE: ICD-10-CM

## 2024-12-09 DIAGNOSIS — R92.30 DENSE BREAST: ICD-10-CM

## 2024-12-09 DIAGNOSIS — Z00.00 HEALTHCARE MAINTENANCE: ICD-10-CM

## 2024-12-09 DIAGNOSIS — Z00.00 ANNUAL PHYSICAL EXAM: Primary | ICD-10-CM

## 2024-12-09 DIAGNOSIS — Z87.19 HISTORY OF COLONIC DIVERTICULITIS: ICD-10-CM

## 2024-12-09 LAB
ALBUMIN SERPL BCP-MCNC: 4 G/DL (ref 3.5–5.2)
ALP SERPL-CCNC: 73 U/L (ref 40–150)
ALT SERPL W/O P-5'-P-CCNC: 15 U/L (ref 10–44)
ANION GAP SERPL CALC-SCNC: 8 MMOL/L (ref 8–16)
AST SERPL-CCNC: 14 U/L (ref 10–40)
BASOPHILS # BLD AUTO: 0.04 K/UL (ref 0–0.2)
BASOPHILS NFR BLD: 1.1 % (ref 0–1.9)
BILIRUB SERPL-MCNC: 0.9 MG/DL (ref 0.1–1)
BUN SERPL-MCNC: 20 MG/DL (ref 8–23)
CALCIUM SERPL-MCNC: 9.8 MG/DL (ref 8.7–10.5)
CHLORIDE SERPL-SCNC: 108 MMOL/L (ref 95–110)
CHOLEST SERPL-MCNC: 202 MG/DL (ref 120–199)
CHOLEST/HDLC SERPL: 2.2 {RATIO} (ref 2–5)
CO2 SERPL-SCNC: 25 MMOL/L (ref 23–29)
CREAT SERPL-MCNC: 0.8 MG/DL (ref 0.5–1.4)
DIFFERENTIAL METHOD BLD: ABNORMAL
EOSINOPHIL # BLD AUTO: 0.1 K/UL (ref 0–0.5)
EOSINOPHIL NFR BLD: 3.1 % (ref 0–8)
ERYTHROCYTE [DISTWIDTH] IN BLOOD BY AUTOMATED COUNT: 12.5 % (ref 11.5–14.5)
EST. GFR  (NO RACE VARIABLE): >60 ML/MIN/1.73 M^2
ESTIMATED AVG GLUCOSE: 111 MG/DL (ref 68–131)
GLUCOSE SERPL-MCNC: 96 MG/DL (ref 70–110)
HBA1C MFR BLD: 5.5 % (ref 4–5.6)
HCT VFR BLD AUTO: 42.5 % (ref 37–48.5)
HCV AB SERPL QL IA: NORMAL
HDLC SERPL-MCNC: 91 MG/DL (ref 40–75)
HDLC SERPL: 45 % (ref 20–50)
HGB BLD-MCNC: 13.6 G/DL (ref 12–16)
IMM GRANULOCYTES # BLD AUTO: 0.01 K/UL (ref 0–0.04)
IMM GRANULOCYTES NFR BLD AUTO: 0.3 % (ref 0–0.5)
LDLC SERPL CALC-MCNC: 102.4 MG/DL (ref 63–159)
LYMPHOCYTES # BLD AUTO: 1.2 K/UL (ref 1–4.8)
LYMPHOCYTES NFR BLD: 34.6 % (ref 18–48)
MCH RBC QN AUTO: 29.6 PG (ref 27–31)
MCHC RBC AUTO-ENTMCNC: 32 G/DL (ref 32–36)
MCV RBC AUTO: 93 FL (ref 82–98)
MONOCYTES # BLD AUTO: 0.3 K/UL (ref 0.3–1)
MONOCYTES NFR BLD: 8.3 % (ref 4–15)
NEUTROPHILS # BLD AUTO: 1.8 K/UL (ref 1.8–7.7)
NEUTROPHILS NFR BLD: 52.6 % (ref 38–73)
NONHDLC SERPL-MCNC: 111 MG/DL
NRBC BLD-RTO: 0 /100 WBC
PLATELET # BLD AUTO: 264 K/UL (ref 150–450)
PMV BLD AUTO: 9.3 FL (ref 9.2–12.9)
POTASSIUM SERPL-SCNC: 4 MMOL/L (ref 3.5–5.1)
PROT SERPL-MCNC: 6.6 G/DL (ref 6–8.4)
RBC # BLD AUTO: 4.59 M/UL (ref 4–5.4)
SODIUM SERPL-SCNC: 141 MMOL/L (ref 136–145)
TRIGL SERPL-MCNC: 43 MG/DL (ref 30–150)
WBC # BLD AUTO: 3.5 K/UL (ref 3.9–12.7)

## 2024-12-09 PROCEDURE — 3044F HG A1C LEVEL LT 7.0%: CPT | Mod: CPTII,S$GLB,, | Performed by: STUDENT IN AN ORGANIZED HEALTH CARE EDUCATION/TRAINING PROGRAM

## 2024-12-09 PROCEDURE — 3078F DIAST BP <80 MM HG: CPT | Mod: CPTII,S$GLB,, | Performed by: STUDENT IN AN ORGANIZED HEALTH CARE EDUCATION/TRAINING PROGRAM

## 2024-12-09 PROCEDURE — 1101F PT FALLS ASSESS-DOCD LE1/YR: CPT | Mod: CPTII,S$GLB,, | Performed by: STUDENT IN AN ORGANIZED HEALTH CARE EDUCATION/TRAINING PROGRAM

## 2024-12-09 PROCEDURE — 83036 HEMOGLOBIN GLYCOSYLATED A1C: CPT | Performed by: STUDENT IN AN ORGANIZED HEALTH CARE EDUCATION/TRAINING PROGRAM

## 2024-12-09 PROCEDURE — 86803 HEPATITIS C AB TEST: CPT | Performed by: STUDENT IN AN ORGANIZED HEALTH CARE EDUCATION/TRAINING PROGRAM

## 2024-12-09 PROCEDURE — 1160F RVW MEDS BY RX/DR IN RCRD: CPT | Mod: CPTII,S$GLB,, | Performed by: STUDENT IN AN ORGANIZED HEALTH CARE EDUCATION/TRAINING PROGRAM

## 2024-12-09 PROCEDURE — 3008F BODY MASS INDEX DOCD: CPT | Mod: CPTII,S$GLB,, | Performed by: STUDENT IN AN ORGANIZED HEALTH CARE EDUCATION/TRAINING PROGRAM

## 2024-12-09 PROCEDURE — 36415 COLL VENOUS BLD VENIPUNCTURE: CPT | Performed by: STUDENT IN AN ORGANIZED HEALTH CARE EDUCATION/TRAINING PROGRAM

## 2024-12-09 PROCEDURE — 80053 COMPREHEN METABOLIC PANEL: CPT | Performed by: STUDENT IN AN ORGANIZED HEALTH CARE EDUCATION/TRAINING PROGRAM

## 2024-12-09 PROCEDURE — 3074F SYST BP LT 130 MM HG: CPT | Mod: CPTII,S$GLB,, | Performed by: STUDENT IN AN ORGANIZED HEALTH CARE EDUCATION/TRAINING PROGRAM

## 2024-12-09 PROCEDURE — 99387 INIT PM E/M NEW PAT 65+ YRS: CPT | Mod: S$GLB,,, | Performed by: STUDENT IN AN ORGANIZED HEALTH CARE EDUCATION/TRAINING PROGRAM

## 2024-12-09 PROCEDURE — 99999 PR PBB SHADOW E&M-EST. PATIENT-LVL IV: CPT | Mod: PBBFAC,,, | Performed by: STUDENT IN AN ORGANIZED HEALTH CARE EDUCATION/TRAINING PROGRAM

## 2024-12-09 PROCEDURE — 1126F AMNT PAIN NOTED NONE PRSNT: CPT | Mod: CPTII,S$GLB,, | Performed by: STUDENT IN AN ORGANIZED HEALTH CARE EDUCATION/TRAINING PROGRAM

## 2024-12-09 PROCEDURE — 3288F FALL RISK ASSESSMENT DOCD: CPT | Mod: CPTII,S$GLB,, | Performed by: STUDENT IN AN ORGANIZED HEALTH CARE EDUCATION/TRAINING PROGRAM

## 2024-12-09 PROCEDURE — 1159F MED LIST DOCD IN RCRD: CPT | Mod: CPTII,S$GLB,, | Performed by: STUDENT IN AN ORGANIZED HEALTH CARE EDUCATION/TRAINING PROGRAM

## 2024-12-09 PROCEDURE — 80061 LIPID PANEL: CPT | Performed by: STUDENT IN AN ORGANIZED HEALTH CARE EDUCATION/TRAINING PROGRAM

## 2024-12-09 PROCEDURE — 85025 COMPLETE CBC W/AUTO DIFF WBC: CPT | Performed by: STUDENT IN AN ORGANIZED HEALTH CARE EDUCATION/TRAINING PROGRAM

## 2024-12-09 RX ORDER — LORAZEPAM 0.5 MG/1
0.5 TABLET ORAL DAILY PRN
Qty: 90 TABLET | Refills: 0 | Status: SHIPPED | OUTPATIENT
Start: 2024-12-09 | End: 2025-12-09

## 2024-12-09 RX ORDER — FLUOXETINE 10 MG/1
10 CAPSULE ORAL DAILY
Qty: 90 CAPSULE | Refills: 3 | Status: SHIPPED | OUTPATIENT
Start: 2024-12-09 | End: 2025-12-09

## 2024-12-09 NOTE — ASSESSMENT & PLAN NOTE
Patient was on Boniva, hasn't taken it recently.  DEXA scan ordered.  Will resume treatment depending on DEXA scan results.

## 2024-12-09 NOTE — ASSESSMENT & PLAN NOTE
Recent diverticulitis (October of 2024), diagnosed with CT scan and MRI of the abdomen.    Management with bowel rest and fiber.    Improved.    Currently asymptomatic.    I am ordering colonoscopy

## 2024-12-09 NOTE — ASSESSMENT & PLAN NOTE
And treated.    Previously managed with as needed lorazepam.  I am starting patient on SSRI.    We will continue to use lorazepam as needed for panic attacks, CVA anxiety or severe insomnia, lower dose.    Have explained to the patient that the management of anxiety is primarily going to be with different medications than benzodiazepines.   reviewed

## 2024-12-09 NOTE — PATIENT INSTRUCTIONS
Get vaccinated against the flu    Continue diet that is rich in fiber    Take a Calcium and vitamin D supplement (Caltrate D)    Continue physical activity and resistance training

## 2024-12-09 NOTE — ASSESSMENT & PLAN NOTE
Health care maintenance and core gaps reviewed and assessed with patient.    Vaccinations recommended:        Tetanus - 2020       Shingles - 2019       Influenza - 2020       Pneumonia - 2023    Colon cancer screening:   O       Lifestyle recommendations given.  Physical activity recommended.    Legend: Ordered (O), Declined (D), Current (C)

## 2024-12-09 NOTE — PROGRESS NOTES
Subjective:       Patient ID: Evie Bertrand is a 69 y.o. female.    Chief Complaint: Annual Exam (Need colonoscopy. ER in October & has diverticulitis & Pain right lower back for 1 yrs.)    HPI    Evie Bertrand is a 69 y.o. female , English speaking, with a history of:  Anxiety   Chronic migraines   Chronic sinusitis   History of diverticulitis   GERD       [Local Patient]  Originally from [facility]  Lives in: [facility] [address]       Patient comes to the clinic a general medical health examination and to establish care.    Patient is currently asymptomatic.    Overall patient states she has been doing well.  This year she had an episode of acute abdominal pain for which she had to visit the emergency department.  She was told she had diverticulitis.  They did CT scans and MRI of the abdomen.  It showed diverticulitis, thickening of the gastric mucosa and gastritis, and as easy gets out of 5 days the images also reported bilateral small renal cysts and simple hepatic cyst.    Her previous primary care provider retired, so she is wanting to switch her primary care services to our practice.    She used to be prescribed lorazepam 1 mg q.6 hours p.r.n. for severe anxiety and insomnia.  She says she only takes it every once in awhile when she has very severe anxiety or when she can not sleep.      Overall she states she gets nervous easily, about work, and about her youngest son that suffers from alcohol use disorder.    His most recent crisis was 2 weeks ago.      She also states that several years ago she had a sinus surgery.  She says that she used to be very sick all the time with sinus infections but that this does not happen anymore.      She has had migraine headaches, but she has been better and she is not taking any medications for prophylaxis.      She used to take Boniva for osteoporosis but she ran out of medications several months ago and she has not taken it in awhile.    She denies any issues with high blood  pressure.      Patient denies CV symptoms, CP, SOB, palpitations.  Patient denies constitutional symptoms, fever, changes in the urine or stool.    No other concerns    Past Medical History:   Diagnosis Date    Allergy     Anxiety disorder, unspecified     Bilateral renal cysts     Chronic sinusitis, unspecified     Diverticulitis 11/2024    Diverticulosis     Gastritis     GERD (gastroesophageal reflux disease)     Migraine headache     Osteoporosis     Simple hepatic cyst        Past Surgical History:   Procedure Laterality Date    SINUS SURGERY         Family History   Problem Relation Name Age of Onset    Diabetes Mother      Hypertension Mother      Sinus disease Mother      Colon cancer Father      Sinus disease Father         Allergies:   Review of patient's allergies indicates:  No Known Allergies      Current Outpatient Medications:     FLUoxetine 10 MG capsule, Take 1 capsule (10 mg total) by mouth once daily., Disp: 90 capsule, Rfl: 3    LORazepam (ATIVAN) 0.5 MG tablet, Take 1 tablet (0.5 mg total) by mouth daily as needed for Anxiety (panic attacks)., Disp: 90 tablet, Rfl: 0    melatonin (MELATIN), Take by mouth. (Patient not taking: Reported on 10/14/2024), Disp: , Rfl:     Social history:  , has two children   is a teacher.  All that is son is 39 years old, he is a , lives in [place] ([place] [facility]).  This year he was in [place] during the most previous bumping.      Youngest son is 34, he lives in town, and has a problem with alcohol abuse.    Exercise:   Physically active  L[facility] close to the university ([facility]    Diet:   Tries to eat healthy    Tobacco use: Denies    Tobacco Use: Low Risk  (12/9/2024)    Patient History     Smoking Tobacco Use: Never     Smokeless Tobacco Use: Never     Passive Exposure: Not on file        Alcohol use: Denies    Recreational drug use: Denies    Recent travel: Denies      Most recent laboratories reviewed:    Recent Labs   Lab 12/20/23  2540  "10/29/24  1708 12/09/24  0930   WBC 11-25 A 0-2 3.50 L   Hemoglobin  --   --  13.6   Hematocrit  --   --  42.5   MCV  --   --  93   Platelets  --   --  264       Recent Labs   Lab 12/09/24  0930   Glucose 96   Sodium 141   Potassium 4.0   BUN 20   Creatinine 0.8   Total Bilirubin 0.9   AST 14   ALT 15       Recent Labs   Lab 12/20/23  0819 12/09/24  0930   Hemoglobin A1C 5.6 5.5       Recent Labs   Lab 12/09/24  0930   Cholesterol 202 H   Triglycerides 43   HDL 91 H   LDL Cholesterol 102.4   Non-HDL Cholesterol 111             Recent Labs   Lab 12/09/24  0930   Hepatitis C Ab Non-reactive         Latest ECG results:  No results found for this or any previous visit.    Healthcare Maintenance:  Colon cancer screening:       Vaccinations:        Tetanus - 2020       Shingles - 2019       Influenza - 2020       Pneumonia - 2023       COVID - completed x 3    Depression screening: PHQ2 score = 0    Annual visit approx. Month: DECEMBER ROS  11-point review of systems done. Negative except for detailed in the HPI.          Objective:      /72   Pulse 68   Ht 5' 5" (1.651 m)   Wt 54.9 kg (121 lb 0.5 oz)   BMI 20.14 kg/m²      Physical Exam   General appearance: Good general aspect, NAD, conversant   Eyes and HEENT: Normal sclerae, moist mucous membranes, no thyromegaly or lymphadenopathy  Respiratory: No work of breathing, clear to auscultation bilaterally. No rales, rhonchi, wheezing, or rubs.  Cardiovascular: PMI not displaced. RRR. Normal S1, S2. No murmurs, rubs or gallops.  Abdomen and : Soft, non-tender, nondistended, BS, no hepatosplenomegaly, no masses.  Extremities: no edemas, no extremity lymphadenopathy, no clubbing, no cyanosis.  Nervous System: Alert and oriented x 3, good concentration, no deficits.  Skin: Normal temperature, turgor and texture; no rash, ulcers or subcutaneous nodules  Psych: Appropriate affect, alert and oriented to person, place and time          Assessment:       1. Annual " physical exam  Assessment & Plan:  Patient is in overall good general health.  Stable medical conditions listed on the PMH.  Labs reviewed and patient notified.      Orders:  -     CBC Auto Differential; Future; Expected date: 11/29/2025  -     Comprehensive Metabolic Panel; Future; Expected date: 11/29/2025  -     Hemoglobin A1C; Future; Expected date: 11/29/2025  -     Lipid Panel; Future; Expected date: 11/29/2025    2. Encounter for medical examination to establish care  Assessment & Plan:  Patient requests to transfer healthcare services to our practice.  We are pleased to continue provided primary care services as requested.        3. Generalized anxiety disorder  Assessment & Plan:  And treated.    Previously managed with as needed lorazepam.  I am starting patient on SSRI.    We will continue to use lorazepam as needed for panic attacks, CVA anxiety or severe insomnia, lower dose.    Have explained to the patient that the management of anxiety is primarily going to be with different medications than benzodiazepines.   reviewed      Orders:  -     FLUoxetine 10 MG capsule; Take 1 capsule (10 mg total) by mouth once daily.  Dispense: 90 capsule; Refill: 3  -     LORazepam (ATIVAN) 0.5 MG tablet; Take 1 tablet (0.5 mg total) by mouth daily as needed for Anxiety (panic attacks).  Dispense: 90 tablet; Refill: 0    4. Gastroesophageal reflux disease, unspecified whether esophagitis present  Assessment & Plan:  Gastritis documented in most recent CT scan of the abdomen.    EGD ordered    Orders:  -     Ambulatory referral/consult to Endo Procedure ; Future; Expected date: 12/10/2024    5. Age-related osteoporosis without current pathological fracture  Assessment & Plan:  Patient was on Boniva, hasn't taken it recently.  DEXA scan ordered.  Will resume treatment depending on DEXA scan results.    Orders:  -     DXA Bone Density Axial Skeleton 1 or more sites; Future; Expected date: 12/09/2024    6.  Chronic migraine without aura, not intractable, without status migrainosus  Assessment & Plan:  Very mild symptoms.    Not on prophylaxis.    Observation for now      7. History of colonic diverticulitis  Assessment & Plan:  Recent diverticulitis (October of 2024), diagnosed with CT scan and MRI of the abdomen.    Management with bowel rest and fiber.    Improved.    Currently asymptomatic.    I am ordering colonoscopy      8. History of diverticulitis  -     Ambulatory referral/consult to Endo Procedure ; Future; Expected date: 12/10/2024    9. Dense breast  -     Mammo Digital Diagnostic Bilat; Future; Expected date: 01/01/2025    10. Healthcare maintenance  Assessment & Plan:  Health care maintenance and core gaps reviewed and assessed with patient.    Vaccinations recommended:        Tetanus - 2020       Shingles - 2019       Influenza - 2020       Pneumonia - 2023    Colon cancer screening:   O       Lifestyle recommendations given.  Physical activity recommended.    Legend: Ordered (O), Declined (D), Current (C)      Orders:  -     Influenza - High Dose (65+) (PF) (IM)       Plan:       Fluoxetine   Lorazepam    reviewed.    EGD colonoscopy   DEXA scan   Mammogram   Influenza vaccine      I will assume as PCP.          Patient Instructions   Get vaccinated against the flu    Continue diet that is rich in fiber    Take a Calcium and vitamin D supplement (Caltrate D)    Continue physical activity and resistance training           Problem list updated  Medications reconciled  Education provided  Lifestyle recommendations given  AVS generated, explained, and sent to the patient.  RTC in : 1 year for annual wellness visit, labs ordered          ANNALEE JAVIER MD, MPH  Internal Medicine  [facility]  [facility]

## 2025-01-13 ENCOUNTER — TELEPHONE (OUTPATIENT)
Dept: GASTROENTEROLOGY | Facility: CLINIC | Age: 70
End: 2025-01-13
Payer: COMMERCIAL

## 2025-01-13 NOTE — TELEPHONE ENCOUNTER
----- Message from Cinthia sent at 1/13/2025  3:04 PM CST -----  Contact: xander @ [phone]  Evie Page calling regarding Appointment Access  (message) for #pt is calling to get appt for colonoscopy, asking for call back

## 2025-01-13 NOTE — TELEPHONE ENCOUNTER
----- Message from Cinthia sent at 1/13/2025  3:04 PM CST -----  Contact: xander @ [phone]  Evie Page calling regarding Appointment Access  (message) for #pt is calling to get appt for colonoscopy, asking for call back

## 2025-01-15 ENCOUNTER — TELEPHONE (OUTPATIENT)
Dept: ENDOSCOPY | Facility: HOSPITAL | Age: 70
End: 2025-01-15
Payer: COMMERCIAL

## 2025-01-15 VITALS — HEIGHT: 65 IN | BODY MASS INDEX: 19.66 KG/M2 | WEIGHT: 118 LBS

## 2025-01-15 DIAGNOSIS — K21.9 GASTROESOPHAGEAL REFLUX DISEASE, UNSPECIFIED WHETHER ESOPHAGITIS PRESENT: ICD-10-CM

## 2025-01-15 DIAGNOSIS — Z12.11 SPECIAL SCREENING FOR MALIGNANT NEOPLASMS, COLON: Primary | ICD-10-CM

## 2025-01-15 DIAGNOSIS — Z87.19 HISTORY OF DIVERTICULITIS: ICD-10-CM

## 2025-01-15 NOTE — TELEPHONE ENCOUNTER
Colonoscopy Procedure Prep Instructions    Date of procedure: 3/12/25 Arrive at: 11:30 AM    Location of Department:   [facility] [address] [place] [address]  Take the [facility] Elevators to 4th Floor Endoscopy Lab    As soon as possible:   your prep from pharmacy and over the counter DULCOLAX LAXATIVE TABLETS            On the day before your procedure   What You CAN do:   You may have clear liquids ONLY-see below for list.     Liquids That Are OK to Drink:   Water  Sports drinks (Gatorade, Power-Aid)  Coffee or tea (no cream or nondairy creamer)  Clear juices without pulp (apple, white grape)  Gelatin desserts (no fruit or toppings)  Clear soda (sprite, coke, ginger ale)  Chicken broth (until 12 midnight the night before procedure)    What You CANNOT do:   Do not EAT solid food, drink milk or anything   colored red.  Do not drink alcohol.  Do not take oral medications within 1 hour of starting   each dose of prep.  No gum chewing or candy morning of procedure                       Note:   (Please disregard the insert instructions from pharmacy).  PEG Bowel Prep is indicated for cleansing of the colon as a preparation for colonoscopy in adults.   Be sure to tell your doctor about all the medicines you take, including prescription and non-prescription medicines, vitamins, and herbal supplements. PEG Bowel Prep may affect how other medicines work.  Medication taken by mouth may not be absorbed properly when taken within 1 hour before the start of each dose of PEG Bowel Prep.    It is not uncommon to experience some abdominal cramping, nausea and/or vomiting when taking the prep. If you have nausea and/or vomiting while taking the prep, stop drinking for 20 to 30 minutes then continue.      How to take prep:    PEG Bowel Prep is a (2-day) prep.    One (1) bottle of prep are required for a complete preparation for colonoscopy. Dilute the solution concentrate as directed prior to  use. You must drink water with each dose of prep, and additional water after each dose.    DOSE 1--Day Before Colonoscopy 3/11/25     Drink at least 6 to 8 glasses of clear liquids from time you wake up until you begin your prep and then continue until bedtime to avoid dehydration.     12:00 pm (NOON) Mix your entire container of prep with lukewarm water and refrigerate. Take four (4) Dulcolax (Bisacodyl) tablets with at least 8 ounces or more of clear liquids.       6:00 pm:    You must complete Steps 1 and 2 below before going to bed:    Step 1-Drink half the liquid in the container within one (1) hour.   Step 2-Refrigerate the remaining half of the liquid for dose 2. See below when to begin this step.                       IMPORTANT: If you experience preparation-related symptoms (for example, nausea, bloating, or cramping), stop, or slow the rate of drinking the additional water until your symptoms decrease.    DOSE 2--Day of the Colonoscopy 3/12/25 at 2-3 AM.    For this dose, repeat Step 1 shown above using the remaining half of the liquid prep.   You may continue drinking water/clear liquids until   2 hours before your colonoscopy or as directed by the scheduling nurse  10:30 AM.      For information about your procedure, two (2) things to view prior to colonoscopy:  Please watch this informational video. It is important to watch this animated consent video prior to your arrival. If you haven't watched the video prior to arriving, you are required to watch it during admission which can causes delays.    Options for viewing:   Using a keyboard:  press and hold the control tab (Ctrl) and left mouse click to follow links.           Colonoscopy Instructional Video                                                                                   OR    Type link address into your web browser's address bar:  [email or web address]      Educational Booklet with pictures:      Colonoscopy Prep -  Liquid      Comments:               IMPORTANT INFORMATION TO KNOW BEFORE YOUR PROCEDURE    [facility] 4th Floor         If your procedure requires the administration of anesthesia, it is necessary for a responsible adult to drive you home. (Medical Transportation, Uber, Lyft, Taxi, etc. may ONLY be used if a responsible adult is present to accompany you home.  The responsible adult CAN'T be the  of the service).      person must be available to return to pick you up within 15 minutes of being notified of discharge.       Please bring a picture ID, insurance card, & copayment      Take Medications as directed below:      If you begin taking any blood thinning medications, injectable weight loss/diabetes medications (other than insulin) , or Adipex (Phentermine) please contact the endoscopy scheduling department listed below as soon as possible.    If you are diabetic see the attached instruction sheet regarding your medication.     If you take HEART, BLOOD PRESSURE, SEIZURE, PAIN, LUNG (including inhalers/nebulizers), ANTI-REJECTION (transplant patients), or PSYCHIATRIC medications, please take at your regular times with a sip of water or as directed by the scheduling nurse.     Important contact information:    Endoscopy [phone] Hours of operation Monday-Friday 8:00-4:30pm.    Questions about insurance or financial obligations call [phone] or [phone].    If you have questions regarding the prep or need to reschedule, please call [phone]. After hours questions requiring immediate assistance, contact [facility] nurse line at [phone] or [phone].   NOTE:     On occasion, unforeseen circumstances may cause a delay in your procedure start time. We respect your time and appreciate your patience during these circumstances.      Comments:          Are you ready for your Colonoscopy?      __ If you take blood thinners,weight loss or  diabetic injectable medications, have you stopped taking them according to your doctor's instructions before your procedures?  __ Have you stopped eating solid foods and followed a clear liquid diet a full day before your procedure or followed the diet       guidelines indicated in your instructions?       REMINDER: NO BROTH AFTER MIDNIGHT THE DAY BEFORE PROCEDURE.   __ Have you completed all your prep solution? PLEASE DO NOT FOLLOW the insert/or box instructions from pharmacy)  __ Have you taken your blood pressure, heart, seizure, or other essential medications the morning of your procedure?  __ Have you planned for a ride to and from procedure?  (Medical Transportation, Uber, Lyft, Taxi, etc. may ONLY be used if a responsible adult is present to accompany you home). The responsible adult CANNOT be the  of the service.  person must be available to return and pick you up within 15 minutes of being notified of discharge.           Questions or Concerns?  Please call us!  [phone] (M-F) [phone] (Nights and weekends)    Listed below are some helpful tips:    Please bring protective cases for eyewear and hearing aids-wear comfortable clothing/shoes.  Follow prep instructions closely so you don't have to do it twice.  Bowel prep is prescription used to clean out the colon before a colonoscopy. The prep increases movement of your colon by causing you to have diarrhea (loose stools). Cleaning out stool from the colon helps your doctor to see in your colon clearly during this procedure.   It is important to stay hydrated before, during and after bowel prep to prevent loss of fluid (dehydration). You can have water and your choice of clear liquids. Reminder: these liquids you will drink in addition to the bowel prep.     Preparing the mixture:    First, mix the prep with water.  Make the taste better by adding a sugar free drink mix (Crystal Light) can improve the taste of your prep.   Use a large bore  (opening) straw. Place towards the back of mouth (throat) as tolerated.  Prepare a prep mixture that is lightly chilled, but not ice-cold. Drinking a large amount of ice-cold liquid can make you feel very ill.  Avoid drinking any RED beverages or eating popsicles with this color for the 24 hours leading up to your procedure. This color can look like blood in the colon.    Consuming the prep:    Take your time. If you feel ill, take a 15-minute break from drinking the prep mixture  Combat hunger and dehydration with clear liquids. Options like JELL-O, or popsicles will help.  Settle in with good reading material. The goal is to clean out 6 feet of colon, so you can plan on spending a good deal of time in the bathroom. Have some good reading material on-hand or an iPad ready to keep yourself entertained!  Keep yourself comfortable. We recommend applying personal hygiene wipes, Tucks pads and a soothing ointment, like A&D ointment, Desitin, or Vaseline to your bottom before starting and as needed to protect your skin.

## 2025-01-15 NOTE — TELEPHONE ENCOUNTER
Honey Baumann Shekeita H RN  Caller: Unspecified (2 days ago,  4:43 PM)         Previous Messages    Patient Calls  (Newest Message First)  View All Conversations on this Encounter   Honey Baumann routed conversation to YouYesterday (9:45 AM)     Cheryl Branch MA routed conversation to [facility] Endoscopy Schedulers2 days ago     Cheryl Branch MA2 days ago     RT  ----- Message from Zimride sent at 1/13/2025  3:04 PM CST -----  Contact: pt @ [phone]  Evie Page calling regarding Appointment Access  (message) for #pt is calling to get appt for colonoscopy, asking for call back            Note     Cheryl Branch MA2 days ago     RT  ----- Message from Cinthia sent at 1/13/2025  3:04 PM CST -----  Contact: pt @ [phone]  Evie Page calling regarding Appointment Access  (message) for #pt is calling to get appt for colonoscopy, asking for call back

## 2025-01-15 NOTE — TELEPHONE ENCOUNTER
Referral for procedure from  Workqueue referral (see Appts tab)      Spoke to patient to schedule procedure(s) Colonoscopy/EGD       Physician to perform procedure(s) Dr. DHAVAL Blake  Date of Procedure (s) 3/12/25  Arrival Time 11:30 AM  Time of Procedure(s) 12:30 PM   Location of Procedure(s) [place] 4th Floor  Type of Rx Prep sent to patient: PEG  Instructions provided to patient via Email    Patient was informed on the following information and verbalized understanding. Screening questionnaire reviewed with patient and complete. If procedure requires anesthesia, a responsible adult needs to be present to accompany the patient home, patient cannot drive after receiving anesthesia. Appointment details are tentative, especially check-in time. Patient will receive a prep-op call 7 days prior to confirm check-in time for procedure. If applicable the patient should contact their pharmacy to verify Rx for procedure prep is ready for pick-up. Patient was advised to call the scheduling department at [phone] if pharmacy states no Rx is available. Patient was advised to call the endoscopy scheduling department if any questions or concerns arise.      [facility] Endoscopy Scheduling Department

## 2025-01-16 ENCOUNTER — TELEPHONE (OUTPATIENT)
Dept: GASTROENTEROLOGY | Facility: CLINIC | Age: 70
End: 2025-01-16
Payer: COMMERCIAL

## 2025-01-16 NOTE — TELEPHONE ENCOUNTER
----- Message from Uma sent at 1/16/2025  1:20 PM CST -----  Type:  Needs Medical Advice    Who Called: Ms Bertrand     Would the patient rather a call back or a response via [facility]? Call   Best Call Back Number: [phone]  Additional Information: she wants to make an appt to see the provider before she has the procedure but, she needs to know if that is possible want to make sure it is coded so, she can see with her insurance

## 2025-01-27 ENCOUNTER — TELEPHONE (OUTPATIENT)
Dept: GASTROENTEROLOGY | Facility: CLINIC | Age: 70
End: 2025-01-27
Payer: COMMERCIAL

## 2025-01-27 ENCOUNTER — HOSPITAL ENCOUNTER (OUTPATIENT)
Dept: RADIOLOGY | Facility: HOSPITAL | Age: 70
Discharge: HOME | End: 2025-01-27
Attending: STUDENT IN AN ORGANIZED HEALTH CARE EDUCATION/TRAINING PROGRAM
Payer: COMMERCIAL

## 2025-01-27 VITALS — HEIGHT: 65 IN | WEIGHT: 118 LBS | BODY MASS INDEX: 19.66 KG/M2

## 2025-01-27 DIAGNOSIS — R92.30 DENSE BREAST: ICD-10-CM

## 2025-01-27 PROCEDURE — 77062 BREAST TOMOSYNTHESIS BI: CPT | Mod: 26,,, | Performed by: RADIOLOGY

## 2025-01-27 PROCEDURE — 77066 DX MAMMO INCL CAD BI: CPT | Mod: 26,,, | Performed by: RADIOLOGY

## 2025-01-27 PROCEDURE — 77066 DX MAMMO INCL CAD BI: CPT | Mod: TC

## 2025-01-27 NOTE — TELEPHONE ENCOUNTER
----- Message from Meredith sent at 1/23/2025 11:55 AM CST -----  Regarding: Requesting  Contact: [phone]  Type:  Needs Medical Advice    Who Called: Evie   Calling to schedule an appointment before procedure  Procedure is scheduled for 3/12   Wanting to discuss   No appt in Epic  Would the patient rather a call back or a response via [facility]? Call back   Best Call Back Number: [phone]    Additional Information:

## 2025-01-27 NOTE — TELEPHONE ENCOUNTER
MA spoke to patient. Patient stated that she would like to see the doctor before her procedure, MA explain that there are no available appointments to see the doctor before her procedure date.

## 2025-02-06 ENCOUNTER — TELEPHONE (OUTPATIENT)
Dept: GASTROENTEROLOGY | Facility: CLINIC | Age: 70
End: 2025-02-06
Payer: COMMERCIAL

## 2025-02-06 NOTE — TELEPHONE ENCOUNTER
MA spoke to patient on last month .MA explained to patient that Dr. Blake does not have any availability before she have her procedure date. Ma offered for patient to see another provider patient refused to do so. Patient stated that she will canal her procedure date.

## 2025-02-10 ENCOUNTER — OFFICE VISIT (OUTPATIENT)
Dept: GASTROENTEROLOGY | Facility: CLINIC | Age: 70
End: 2025-02-10
Payer: COMMERCIAL

## 2025-02-10 VITALS
BODY MASS INDEX: 20.06 KG/M2 | HEART RATE: 83 BPM | WEIGHT: 120.38 LBS | DIASTOLIC BLOOD PRESSURE: 82 MMHG | HEIGHT: 65 IN | SYSTOLIC BLOOD PRESSURE: 159 MMHG

## 2025-02-10 DIAGNOSIS — Z87.19 HISTORY OF COLONIC DIVERTICULITIS: Primary | ICD-10-CM

## 2025-02-10 DIAGNOSIS — K21.9 GASTROESOPHAGEAL REFLUX DISEASE, UNSPECIFIED WHETHER ESOPHAGITIS PRESENT: ICD-10-CM

## 2025-02-10 DIAGNOSIS — R93.5 ABNORMAL CT OF THE ABDOMEN: ICD-10-CM

## 2025-02-10 PROCEDURE — 1159F MED LIST DOCD IN RCRD: CPT | Mod: CPTII,S$GLB,, | Performed by: STUDENT IN AN ORGANIZED HEALTH CARE EDUCATION/TRAINING PROGRAM

## 2025-02-10 PROCEDURE — 3077F SYST BP >= 140 MM HG: CPT | Mod: CPTII,S$GLB,, | Performed by: STUDENT IN AN ORGANIZED HEALTH CARE EDUCATION/TRAINING PROGRAM

## 2025-02-10 PROCEDURE — 99999 PR PBB SHADOW E&M-EST. PATIENT-LVL III: CPT | Mod: PBBFAC,,, | Performed by: STUDENT IN AN ORGANIZED HEALTH CARE EDUCATION/TRAINING PROGRAM

## 2025-02-10 PROCEDURE — 3288F FALL RISK ASSESSMENT DOCD: CPT | Mod: CPTII,S$GLB,, | Performed by: STUDENT IN AN ORGANIZED HEALTH CARE EDUCATION/TRAINING PROGRAM

## 2025-02-10 PROCEDURE — 99204 OFFICE O/P NEW MOD 45 MIN: CPT | Mod: S$GLB,,, | Performed by: STUDENT IN AN ORGANIZED HEALTH CARE EDUCATION/TRAINING PROGRAM

## 2025-02-10 PROCEDURE — 3008F BODY MASS INDEX DOCD: CPT | Mod: CPTII,S$GLB,, | Performed by: STUDENT IN AN ORGANIZED HEALTH CARE EDUCATION/TRAINING PROGRAM

## 2025-02-10 PROCEDURE — 3079F DIAST BP 80-89 MM HG: CPT | Mod: CPTII,S$GLB,, | Performed by: STUDENT IN AN ORGANIZED HEALTH CARE EDUCATION/TRAINING PROGRAM

## 2025-02-10 PROCEDURE — 1101F PT FALLS ASSESS-DOCD LE1/YR: CPT | Mod: CPTII,S$GLB,, | Performed by: STUDENT IN AN ORGANIZED HEALTH CARE EDUCATION/TRAINING PROGRAM

## 2025-02-10 PROCEDURE — 1126F AMNT PAIN NOTED NONE PRSNT: CPT | Mod: CPTII,S$GLB,, | Performed by: STUDENT IN AN ORGANIZED HEALTH CARE EDUCATION/TRAINING PROGRAM

## 2025-02-10 NOTE — PROGRESS NOTES
"    [facility] Consultation Note    Reason for Consult:  The primary encounter diagnosis was History of colonic diverticulitis. Diagnoses of Gastroesophageal reflux disease, unspecified whether esophagitis present and Abnormal CT of the abdomen were also pertinent to this visit.    PCP:   Celsa Joya       Referring MD:  No referring provider defined for this encounter.    HPI:  This is a 69 y.o. female here for evaluation of colon cancer screening.    History is notable for diverticulitis and GERD.  EGD and Colonoscopy are scheduled for 3/12.     She notes that in October of 2024 she had significant abdominal pain and was diagnosed with sigmoid diverticulitis in the ED.  She was treated with antibiotics.  A subsequent CT was done later the same month that noted gastric antral thickening.    She notes her father passed of colon cancer and she has undergone multiple colonoscopies.  Her last colonoscopy was roughly 5 years ago.  Unclear if she had any polyps.    Objective Findings:    Vital Signs:  BP (!) 159/82 (BP Location: Right arm, Patient Position: Sitting)   Pulse 83   Ht 5' 5" (1.651 m)   Wt 54.6 kg (120 lb 5.9 oz)   BMI 20.03 kg/m²   Body mass index is 20.03 kg/m².    Physical Exam:  General Appearance: Well appearing in no acute distress      Assessment:  1. History of colonic diverticulitis    2. Gastroesophageal reflux disease, unspecified whether esophagitis present    3. Abnormal CT of the abdomen      In brief, the patient is a 69-year-old female who presents to GI clinic in the setting of prior diverticulitis and abnormal CT imaging.      The patient had an episode of uncomplicated diverticulitis in his now doing well.  I have advised her to follow a high-fiber diet.  She has a colonoscopy scheduled for next month for polyp surveillance.    Additionally, a CT scan was notable for gastric thickening.  She is without any upper GI complaints at this time.  She has an EGD " scheduled at the time of her colonoscopy.  We will assess for any mucosal abnormalities.        No follow-ups on file.      Order summary:         Thank you so much for allowing me to participate in the care of Evie Blake MD

## 2025-03-06 ENCOUNTER — TELEPHONE (OUTPATIENT)
Dept: GASTROENTEROLOGY | Facility: CLINIC | Age: 70
End: 2025-03-06
Payer: COMMERCIAL

## 2025-03-06 NOTE — TELEPHONE ENCOUNTER
----- Message from Anish sent at 3/6/2025  9:21 AM CST -----  Regarding: Consult/Advisory  Contact: [phone]  Consult/Advisory Name Of Caller: pt   Contact Preference:   [phone] Nature of call: would like to confirm that provider understands why procedure is needed, please call to advise thank you

## 2025-03-06 NOTE — TELEPHONE ENCOUNTER
----- Message from Milvia sent at 3/6/2025  2:37 PM CST -----  Regarding: Consult/Advisory  Contact: [phone]  Consult/AdvisoryName Of Caller:Bren Preference:[phone] Nature of call: Pt following up to see if provider found out why pt was schedule for a endoscopy before 03/12 appts? Cause she does not want it if she does not need it, pt asking provider to give her call.Please Call to Advise,Thank you.

## 2025-03-07 ENCOUNTER — TELEPHONE (OUTPATIENT)
Dept: GASTROENTEROLOGY | Facility: CLINIC | Age: 70
End: 2025-03-07
Payer: COMMERCIAL

## 2025-03-07 NOTE — TELEPHONE ENCOUNTER
----- Message from Rach sent at 3/7/2025  9:44 AM CST -----  Regarding: Missed Call  Contact: Evie  Returning a Missed CallCaller: Evie Returning call to: Kaushik Griffith can be reached @: [phone] (home)  Nature of the call: Pt missed a call yesterday . Would like a call back as soon as you can

## 2025-03-12 ENCOUNTER — ANESTHESIA (OUTPATIENT)
Dept: ENDOSCOPY | Facility: HOSPITAL | Age: 70
End: 2025-03-12
Payer: COMMERCIAL

## 2025-03-12 ENCOUNTER — HOSPITAL ENCOUNTER (OUTPATIENT)
Facility: HOSPITAL | Age: 70
Discharge: HOME | End: 2025-03-12
Attending: STUDENT IN AN ORGANIZED HEALTH CARE EDUCATION/TRAINING PROGRAM | Admitting: STUDENT IN AN ORGANIZED HEALTH CARE EDUCATION/TRAINING PROGRAM
Payer: COMMERCIAL

## 2025-03-12 ENCOUNTER — RESULTS FOLLOW-UP (OUTPATIENT)
Dept: INTERNAL MEDICINE | Facility: CLINIC | Age: 70
End: 2025-03-12
Payer: COMMERCIAL

## 2025-03-12 ENCOUNTER — ANESTHESIA EVENT (OUTPATIENT)
Dept: ENDOSCOPY | Facility: HOSPITAL | Age: 70
End: 2025-03-12
Payer: COMMERCIAL

## 2025-03-12 VITALS
DIASTOLIC BLOOD PRESSURE: 57 MMHG | OXYGEN SATURATION: 100 % | TEMPERATURE: 97 F | WEIGHT: 112.63 LBS | SYSTOLIC BLOOD PRESSURE: 116 MMHG | BODY MASS INDEX: 18.77 KG/M2 | HEART RATE: 68 BPM | RESPIRATION RATE: 16 BRPM | HEIGHT: 65 IN

## 2025-03-12 DIAGNOSIS — K21.9 GERD (GASTROESOPHAGEAL REFLUX DISEASE): ICD-10-CM

## 2025-03-12 DIAGNOSIS — K21.9 GASTROESOPHAGEAL REFLUX DISEASE, UNSPECIFIED WHETHER ESOPHAGITIS PRESENT: Primary | ICD-10-CM

## 2025-03-12 PROCEDURE — 37000009 HC ANESTHESIA EA ADD 15 MINS: Performed by: STUDENT IN AN ORGANIZED HEALTH CARE EDUCATION/TRAINING PROGRAM

## 2025-03-12 PROCEDURE — 45380 COLONOSCOPY AND BIOPSY: CPT | Performed by: STUDENT IN AN ORGANIZED HEALTH CARE EDUCATION/TRAINING PROGRAM

## 2025-03-12 PROCEDURE — 37000008 HC ANESTHESIA 1ST 15 MINUTES: Performed by: STUDENT IN AN ORGANIZED HEALTH CARE EDUCATION/TRAINING PROGRAM

## 2025-03-12 PROCEDURE — 45380 COLONOSCOPY AND BIOPSY: CPT | Mod: ,,, | Performed by: STUDENT IN AN ORGANIZED HEALTH CARE EDUCATION/TRAINING PROGRAM

## 2025-03-12 PROCEDURE — 63600175 PHARM REV CODE 636 W HCPCS: Performed by: NURSE ANESTHETIST, CERTIFIED REGISTERED

## 2025-03-12 PROCEDURE — 88305 TISSUE EXAM BY PATHOLOGIST: CPT | Performed by: STUDENT IN AN ORGANIZED HEALTH CARE EDUCATION/TRAINING PROGRAM

## 2025-03-12 PROCEDURE — 43235 EGD DIAGNOSTIC BRUSH WASH: CPT | Mod: 51,,, | Performed by: STUDENT IN AN ORGANIZED HEALTH CARE EDUCATION/TRAINING PROGRAM

## 2025-03-12 PROCEDURE — 27201012 HC FORCEPS, HOT/COLD, DISP: Performed by: STUDENT IN AN ORGANIZED HEALTH CARE EDUCATION/TRAINING PROGRAM

## 2025-03-12 PROCEDURE — 25000003 PHARM REV CODE 250: Performed by: NURSE ANESTHETIST, CERTIFIED REGISTERED

## 2025-03-12 PROCEDURE — 88305 TISSUE EXAM BY PATHOLOGIST: CPT | Mod: 26,,, | Performed by: STUDENT IN AN ORGANIZED HEALTH CARE EDUCATION/TRAINING PROGRAM

## 2025-03-12 PROCEDURE — 43235 EGD DIAGNOSTIC BRUSH WASH: CPT | Performed by: STUDENT IN AN ORGANIZED HEALTH CARE EDUCATION/TRAINING PROGRAM

## 2025-03-12 RX ORDER — PROPOFOL 10 MG/ML
VIAL (ML) INTRAVENOUS CONTINUOUS PRN
Status: DISCONTINUED | OUTPATIENT
Start: 2025-03-12 | End: 2025-03-12

## 2025-03-12 RX ORDER — LIDOCAINE HYDROCHLORIDE 20 MG/ML
INJECTION INTRAVENOUS
Status: DISCONTINUED | OUTPATIENT
Start: 2025-03-12 | End: 2025-03-12

## 2025-03-12 RX ORDER — SODIUM CHLORIDE 9 MG/ML
INJECTION, SOLUTION INTRAVENOUS CONTINUOUS
Status: DISCONTINUED | OUTPATIENT
Start: 2025-03-12 | End: 2025-03-12 | Stop reason: HOSPADM

## 2025-03-12 RX ADMIN — LIDOCAINE HYDROCHLORIDE 100 MG: 20 INJECTION INTRAVENOUS at 12:03

## 2025-03-12 RX ADMIN — SODIUM CHLORIDE: 0.9 INJECTION, SOLUTION INTRAVENOUS at 12:03

## 2025-03-12 RX ADMIN — PROPOFOL 200 MCG/KG/MIN: 10 INJECTION, EMULSION INTRAVENOUS at 12:03

## 2025-03-12 NOTE — PROVATION PATIENT INSTRUCTIONS
Discharge Summary/Instructions after an Endoscopic Procedure  Patient Name: Evie Bertrand  Patient MRN: [id number]  Patient YOB: 1955  Wednesday, March 12, 2025  Ruddy Blake MD  Dear patient,  As a result of recent federal legislation (The Federal Cures Act), you may   receive lab or pathology results from your procedure in your [facility]   account before your physician is able to contact you. Your physician or   their representative will relay the results to you with their   recommendations at their soonest availability.  Thank you,  RESTRICTIONS:  During your procedure today, you received medications for sedation.  These   medications may affect your judgment, balance and coordination.  Therefore,   for 24 hours, you have the following restrictions:   - DO NOT drive a car, operate machinery, make legal/financial decisions,   sign important papers or drink alcohol.    ACTIVITY:  Today: no heavy lifting, straining or running due to procedural   sedation/anesthesia.  The following day: return to full activity including work.  DIET:  Eat and drink normally unless instructed otherwise.     TREATMENT FOR COMMON SIDE EFFECTS:  - Mild abdominal pain, nausea, belching, bloating or excessive gas:  rest,   eat lightly and use a heating pad.  - Sore Throat: treat with throat lozenges and/or gargle with warm salt   water.  - Because air was used during the procedure, expelling large amounts of air   from your rectum or belching is normal.  - If a bowel prep was taken, you may not have a bowel movement for 1-3 days.    This is normal.  SYMPTOMS TO WATCH FOR AND REPORT TO YOUR PHYSICIAN:  1. Abdominal pain or bloating, other than gas cramps.  2. Chest pain.  3. Back pain.  4. Signs of infection such as: chills or fever occurring within 24 hours   after the procedure.  5. Rectal bleeding, which would show as bright red, maroon, or black stools.   (A tablespoon of blood from the rectum is not serious, especially  if   hemorrhoids are present.)  6. Vomiting.  7. Weakness or dizziness.  GO DIRECTLY TO THE NEAREST EMERGENCY ROOM IF YOU HAVE ANY OF THE FOLLOWING:      Difficulty breathing              Chills and/or fever over 101 F   Persistent vomiting and/or vomiting blood   Severe abdominal pain   Severe chest pain   Black, tarry stools   Bleeding- more than one tablespoon   Any other symptom or condition that you feel may need urgent attention  Your doctor recommends these additional instructions:  If any biopsies were taken, your doctors clinic will contact you in 1 to 2   weeks with any results.  - Discharge patient to home.   - The findings and recommendations were discussed with the patient.   - Continue present medications.   - Discharge patient to home.  For questions, problems or results please call your physician - Ruddy Blake MD at Work:  [phone].  [facility], EMERGENCY ROOM PHONE NUMBER: [phone]  IF A COMPLICATION OR EMERGENCY SITUATION ARISES AND YOU ARE UNABLE TO REACH   YOUR PHYSICIAN - GO DIRECTLY TO THE EMERGENCY ROOM.  Ruddy Blake MD  3/12/2025 12:48:05 PM  This report has been verified and signed electronically.  Dear patient,  As a result of recent federal legislation (The Federal Cures Act), you may   receive lab or pathology results from your procedure in your [facility]   account before your physician is able to contact you. Your physician or   their representative will relay the results to you with their   recommendations at their soonest availability.  Thank you,  PROVATION

## 2025-03-12 NOTE — PROVATION PATIENT INSTRUCTIONS
Discharge Summary/Instructions after an Endoscopic Procedure  Patient Name: Evie Bertrand  Patient MRN: [id number]  Patient YOB: 1955  Wednesday, March 12, 2025  Ruddy Blake MD  Dear patient,  As a result of recent federal legislation (The Federal Cures Act), you may   receive lab or pathology results from your procedure in your [facility]   account before your physician is able to contact you. Your physician or   their representative will relay the results to you with their   recommendations at their soonest availability.  Thank you,  RESTRICTIONS:  During your procedure today, you received medications for sedation.  These   medications may affect your judgment, balance and coordination.  Therefore,   for 24 hours, you have the following restrictions:   - DO NOT drive a car, operate machinery, make legal/financial decisions,   sign important papers or drink alcohol.    ACTIVITY:  Today: no heavy lifting, straining or running due to procedural   sedation/anesthesia.  The following day: return to full activity including work.  DIET:  Eat and drink normally unless instructed otherwise.     TREATMENT FOR COMMON SIDE EFFECTS:  - Mild abdominal pain, nausea, belching, bloating or excessive gas:  rest,   eat lightly and use a heating pad.  - Sore Throat: treat with throat lozenges and/or gargle with warm salt   water.  - Because air was used during the procedure, expelling large amounts of air   from your rectum or belching is normal.  - If a bowel prep was taken, you may not have a bowel movement for 1-3 days.    This is normal.  SYMPTOMS TO WATCH FOR AND REPORT TO YOUR PHYSICIAN:  1. Abdominal pain or bloating, other than gas cramps.  2. Chest pain.  3. Back pain.  4. Signs of infection such as: chills or fever occurring within 24 hours   after the procedure.  5. Rectal bleeding, which would show as bright red, maroon, or black stools.   (A tablespoon of blood from the rectum is not serious, especially  if   hemorrhoids are present.)  6. Vomiting.  7. Weakness or dizziness.  GO DIRECTLY TO THE NEAREST EMERGENCY ROOM IF YOU HAVE ANY OF THE FOLLOWING:      Difficulty breathing              Chills and/or fever over 101 F   Persistent vomiting and/or vomiting blood   Severe abdominal pain   Severe chest pain   Black, tarry stools   Bleeding- more than one tablespoon   Any other symptom or condition that you feel may need urgent attention  Your doctor recommends these additional instructions:  If any biopsies were taken, your doctors clinic will contact you in 1 to 2   weeks with any results.  - Discharge patient to home (ambulatory).   - Patient has a contact number available for emergencies.  The signs and   symptoms of potential delayed complications were discussed with the   patient.  Return to normal activities tomorrow.  Written discharge   instructions were provided to the patient.   - Resume previous diet.   - Continue present medications.   - Return to primary care physician as previously scheduled.   - Repeat colonoscopy in 5 years for surveillance.  For questions, problems or results please call your physician - Ruddy Blake MD at Work:  [phone].  [facility], EMERGENCY ROOM PHONE NUMBER: [phone]  IF A COMPLICATION OR EMERGENCY SITUATION ARISES AND YOU ARE UNABLE TO REACH   YOUR PHYSICIAN - GO DIRECTLY TO THE EMERGENCY ROOM.  Ruddy Blake MD  3/12/2025 1:09:15 PM  This report has been verified and signed electronically.  Dear patient,  As a result of recent federal legislation (The Federal Cures Act), you may   receive lab or pathology results from your procedure in your [facility]   account before your physician is able to contact you. Your physician or   their representative will relay the results to you with their   recommendations at their soonest availability.  Thank you,  PROVATION

## 2025-03-12 NOTE — H&P
Short Stay Endoscopy History and Physical    PCP - Celsa Joya MD  Referring Physician - Celsa Joya MD  [address] [place] [address]    Procedure - EGD and Colonoscopy  ASA - per anesthesia  Mallampati - per anesthesia  History of Anesthesia problems - no  Family history Anesthesia problems -  no   Plan of anesthesia - General    HPI  69 y.o. female    Reason for procedure:   History of diverticulitis [Z87.19]  Gastroesophageal reflux disease, unspecified whether esophagitis present [K21.9]      ROS:  Constitutional: No fevers, chills, No weight loss  CV: No chest pain  Pulm: No cough, No shortness of breath  GI: see HPI    Medical History:  has a past medical history of Allergy, Anxiety disorder, unspecified, Bilateral renal cysts, Chronic sinusitis, unspecified, Diverticulitis (11/2024), Diverticulosis, Gastritis, GERD (gastroesophageal reflux disease), Migraine headache, Osteoporosis, and Simple hepatic cyst.    Surgical History:  has a past surgical history that includes Sinus surgery.    Family History: family history includes Breast cancer in her maternal aunt and sister; Colon cancer in her father; Diabetes in her mother; Hypertension in her mother; Sinus disease in her father and mother.    Social History:  reports that she has never smoked. She has never used smokeless tobacco. She reports that she does not currently use alcohol. She reports that she does not use drugs.    Review of patient's allergies indicates:  No Known Allergies    Medications:   Prescriptions Prior to Admission[1]    Physical Exam:    Vital Signs: There were no vitals filed for this visit.    General Appearance: Well appearing in no acute distress  Abdomen: Soft, non tender, non distended with normal bowel sounds, no masses    Labs:  Lab Results   Component Value Date    WBC 3.50 (L) 12/09/2024    HGB 13.6 12/09/2024    HCT 42.5 12/09/2024     12/09/2024    CHOL 202 (H) 12/09/2024    TRIG 43  12/09/2024    HDL 91 (H) 12/09/2024    ALT 15 12/09/2024    AST 14 12/09/2024     12/09/2024    K 4.0 12/09/2024     12/09/2024    CREATININE 0.8 12/09/2024    BUN 20 12/09/2024    CO2 25 12/09/2024    HGBA1C 5.5 12/09/2024       I have explained the risks and benefits of this endoscopic procedure to the patient including but not limited to bleeding, inflammation, infection, perforation, and death.      Ruddy Blake MD         [1]   Medications Prior to Admission   Medication Sig Dispense Refill Last Dose/Taking    FLUoxetine 10 MG capsule Take 1 capsule (10 mg total) by mouth once daily. 90 capsule 3     LORazepam (ATIVAN) 0.5 MG tablet Take 1 tablet (0.5 mg total) by mouth daily as needed for Anxiety (panic attacks). 90 tablet 0     melatonin (MELATIN) Take by mouth. (Patient not taking: Reported on 2/10/2025)

## 2025-03-12 NOTE — ANESTHESIA PREPROCEDURE EVALUATION
03/12/2025  Evie Bertrand is a 69 y.o., female.      Pre-op Assessment    I have reviewed the Patient Summary Reports.     I have reviewed the Nursing Notes. I have reviewed the NPO Status.   I have reviewed the Medications.     Review of Systems  Cardiovascular:   Functional Capacity good / => 4 METS                         Renal/:  Chronic Renal Disease                Hepatic/GI:     GERD Liver Disease,               Neurological:      Headaches                                 Psych:  Psychiatric History                  Physical Exam  General: Well nourished, Cooperative, Alert and Oriented    Airway:  Mallampati: II   Mouth Opening: Normal  TM Distance: Normal  Tongue: Normal  Neck ROM: Normal ROM    Dental:  Intact        Anesthesia Plan  Type of Anesthesia, risks & benefits discussed:    Anesthesia Type: Gen Natural Airway  Intra-op Monitoring Plan: Standard ASA Monitors  Post Op Pain Control Plan: multimodal analgesia  Induction:  IV  Informed Consent: Informed consent signed with the Patient and all parties understand the risks and agree with anesthesia plan.  All questions answered.   ASA Score: 2  Day of Surgery Review of History & Physical: H&P Update referred to the surgeon/provider.I have interviewed and examined the patient. I have reviewed the patient's H&P dated:     Ready For Surgery From Anesthesia Perspective.     .

## 2025-03-12 NOTE — ANESTHESIA POSTPROCEDURE EVALUATION
Anesthesia Post Evaluation    Patient: Evie Bertrand    Procedure(s) Performed: Procedure(s) (LRB):  EGD (ESOPHAGOGASTRODUODENOSCOPY) (N/A)  COLONOSCOPY (N/A)    Final Anesthesia Type: general      Patient location during evaluation: GI PACU  Patient participation: Yes- Able to Participate  Level of consciousness: awake and alert  Post-procedure vital signs: reviewed and stable  Pain management: adequate  Airway patency: patent  JUANA mitigation strategies: Multimodal analgesia  PONV status at discharge: No PONV  Anesthetic complications: no      Cardiovascular status: blood pressure returned to baseline, hemodynamically stable and stable  Respiratory status: unassisted, room air and spontaneous ventilation  Hydration status: euvolemic  Follow-up not needed.              Vitals Value Taken Time   /55 03/12/25 13:28   Temp 36.2 °C (97.2 °F) 03/12/25 13:18   Pulse 75 03/12/25 13:28   Resp 16 03/12/25 13:28   SpO2 100 % 03/12/25 13:28         No case tracking events are documented in the log.      Pain/Brandie Score: Brandie Score: 10 (3/12/2025  1:23 PM)

## 2025-03-12 NOTE — TRANSFER OF CARE
"Anesthesia Transfer of Care Note    Patient: Evie Bertrand    Procedure(s) Performed: Procedure(s) (LRB):  EGD (ESOPHAGOGASTRODUODENOSCOPY) (N/A)  COLONOSCOPY (N/A)    Patient location: PACU    Anesthesia Type: general    Transport from OR: Transported from OR on 2-3 L/min O2 by NC with adequate spontaneous ventilation    Post pain: adequate analgesia    Post assessment: no apparent anesthetic complications    Post vital signs: stable    Level of consciousness: sedated    Nausea/Vomiting: no nausea/vomiting    Complications: none    Transfer of care protocol was followed      Last vitals: Visit Vitals  BP (!) 145/67 (BP Location: Left arm, Patient Position: Lying)   Pulse 81   Temp 36.8 °C (98.2 °F)   Resp 18   Ht 5' 5" (1.651 m)   Wt 51.1 kg (112 lb 10.5 oz)   SpO2 100%   Breastfeeding No   BMI 18.75 kg/m²     "

## 2025-03-14 LAB
FINAL PATHOLOGIC DIAGNOSIS: NORMAL
GROSS: NORMAL
Lab: NORMAL
MICROSCOPIC EXAM: NORMAL

## 2025-05-09 ENCOUNTER — OFFICE VISIT (OUTPATIENT)
Dept: INTERNAL MEDICINE | Facility: CLINIC | Age: 70
End: 2025-05-09
Payer: COMMERCIAL

## 2025-05-09 ENCOUNTER — TELEPHONE (OUTPATIENT)
Dept: INTERNAL MEDICINE | Facility: CLINIC | Age: 70
End: 2025-05-09

## 2025-05-09 DIAGNOSIS — J06.9 UPPER RESPIRATORY TRACT INFECTION, UNSPECIFIED TYPE: Primary | ICD-10-CM

## 2025-05-09 RX ORDER — OSELTAMIVIR PHOSPHATE 75 MG/1
75 CAPSULE ORAL 2 TIMES DAILY
Qty: 10 CAPSULE | Refills: 0 | Status: SHIPPED | OUTPATIENT
Start: 2025-05-09 | End: 2025-05-14

## 2025-05-09 NOTE — PROGRESS NOTES
Audio Only Telehealth Visit     The patient location is: [place]  The chief complaint leading to consultation is: Cold  Visit type: Virtual visit with audio only (telephone)  Total time spent in medical discussion with patient: 10 minutes  Total time spent on date of the encounter:10 minutes       The reason for the audio only service rather than synchronous audio and video virtual visit was related to technical difficulties or patient preference/necessity.       Each patient to whom I provide medical services by telemedicine is:  (1) informed of the relationship between the physician and patient and the respective role of any other health care provider with respect to management of the patient; and (2) notified that they may decline to receive medical services by telemedicine and may withdraw from such care at any time. Patient verbally consented to receive this service via voice-only telephone call.       HPI:   Anxiety   Chronic migraines   Chronic sinusitis   History of diverticulitis   GERD       [Local Patient]  Originally from [facility]  Lives in: [place] [address]         Patient comes to the clinic a general medical health examination and to establish care.    Patient reports having 2 days of upper respiratory symptoms runny nose, congestion, fever, sore throat, cough, mild SOB.  She has also had body aches, fatigue.    She has been taking Tylenol and Robitussin.    Her son and some of his friends has been sick for the last 2 weeks with similar symptoms.    Patient denies adenopathies, chest pain, palpitations, shortness for breath.  Her predominant symptom is body aches and fatigue.    No other concerns        Assessment and plan:    1. Upper respiratory tract infection, unspecified type  Assessment & Plan:  Possibly influenza.    I am treating empirically with Tamiflu and over-the-counter cold medications.    I have sent handout about symptomatic treatment for cold symptoms    Orders:  -     oseltamivir  (TAMIFLU) 75 MG capsule; Take 1 capsule (75 mg total) by mouth 2 (two) times daily. for 5 days  Dispense: 10 capsule; Refill: 0                This service was not originating from a related E/M service provided within the previous 7 days nor will  to an E/M service or procedure within the next 24 hours or my soonest available appointment.  Prevailing standard of care was able to be met in this audio-only visit.

## 2025-05-09 NOTE — ASSESSMENT & PLAN NOTE
Possibly influenza.    I am treating empirically with Tamiflu and over-the-counter cold medications.    I have sent handout about symptomatic treatment for cold symptoms

## 2025-07-10 ENCOUNTER — PATIENT MESSAGE (OUTPATIENT)
Dept: OTOLARYNGOLOGY | Facility: CLINIC | Age: 70
End: 2025-07-10

## 2025-07-10 ENCOUNTER — OFFICE VISIT (OUTPATIENT)
Dept: OTOLARYNGOLOGY | Facility: CLINIC | Age: 70
End: 2025-07-10
Payer: COMMERCIAL

## 2025-07-10 VITALS
BODY MASS INDEX: 20.43 KG/M2 | HEART RATE: 71 BPM | DIASTOLIC BLOOD PRESSURE: 72 MMHG | WEIGHT: 122.81 LBS | SYSTOLIC BLOOD PRESSURE: 120 MMHG

## 2025-07-10 DIAGNOSIS — J02.9 SORE THROAT: Primary | ICD-10-CM

## 2025-07-10 LAB
CTP QC/QA: YES
S PYO RRNA THROAT QL PROBE: NEGATIVE

## 2025-07-10 PROCEDURE — 99999 PR PBB SHADOW E&M-EST. PATIENT-LVL III: CPT | Mod: PBBFAC,,, | Performed by: PHYSICIAN ASSISTANT

## 2025-07-10 RX ORDER — NAPROXEN 500 MG/1
500 TABLET ORAL 2 TIMES DAILY WITH MEALS
Qty: 10 TABLET | Refills: 0 | Status: SHIPPED | OUTPATIENT
Start: 2025-07-10 | End: 2025-07-17

## 2025-07-10 RX ORDER — AMOXICILLIN AND CLAVULANATE POTASSIUM 875; 125 MG/1; MG/1
1 TABLET, FILM COATED ORAL EVERY 12 HOURS
Qty: 20 TABLET | Refills: 0 | Status: SHIPPED | OUTPATIENT
Start: 2025-07-10 | End: 2025-07-20

## 2025-07-10 NOTE — PROGRESS NOTES
Chief Complaint   Patient presents with    Sore Throat/diff. swallowing/fatigue         70 y.o. female presents with sore throat onset 3 days ago and worsening. She reports fatigue and subjective fever but temperature has been normal when checked. She also reports some nasal congestion. She does have some odynophagia. 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 Throat pain is keeping her up at night. She is using OTC lozenges, spray, gargling with salt water. She has not been on any antibiotics.     Never smoker.   PSHx: sinus surgery      Review of Systems     Constitutional: +fatigue  HENT: per HPI      Past Medical History:   Diagnosis Date    Allergy     Anxiety disorder, unspecified     Bilateral renal cysts     Chronic sinusitis, unspecified     Diverticulitis 11/2024    Diverticulosis     Gastritis     GERD (gastroesophageal reflux disease)     Migraine headache     Osteoporosis     Simple hepatic cyst        Past Surgical History:   Procedure Laterality Date    COLONOSCOPY N/A 3/12/2025    Procedure: COLONOSCOPY;  Surgeon: Ruddy Blake MD;  Location: [facility] (4TH FLR);  Service: Endoscopy;  Laterality: N/A;  1/15 ref Celsa Joya MD, PEG, emailed to [email or web address] -    ESOPHAGOGASTRODUODENOSCOPY N/A 3/12/2025    Procedure: EGD (ESOPHAGOGASTRODUODENOSCOPY);  Surgeon: Ruddy Blake MD;  [facility]  Service: Endoscopy;  Laterality: N/A;  3/3 VM left re: appt date AW  3-6-25 LVM Precall incomplete- MOW  3/7/25 - precall complete - LW    SINUS SURGERY         family history includes Breast cancer in her maternal aunt and sister; Colon cancer in her father; Diabetes in her mother; Hypertension in her mother; Sinus disease in her father and mother.    Pt  reports that she has never smoked. She has never used smokeless tobacco. She reports that she does not currently use alcohol. She reports that she does not use drugs.    Review of patient's allergies  indicates:  No Known Allergies     Physical Exam    Vitals:    07/10/25 1102   BP: 120/72   Pulse: 71     Body mass index is 20.43 kg/m².    General: AOx3, NAD  Right Ear: External Auditory Canal WNL  Left Ear:  External Auditory Canal WNL  Nose: No gross nasal septal deviation.  Inferior Turbinates WNL bilaterally.  No septal perforation.  No masses/lesions.  Oral Cavity: FOM Soft, no masses palpated.  Oral Tongue mobile.  Hard Palate WNL.  Oropharynx: BOT WNL.  No masses/lesions noted.  +posterior oropharyngeal erythema, no exudates. Soft palate without masses.  Midline uvula.  Neck: No palpable lymphadenopathy at I - VI.    Face: House Brackmann I bilaterally.  Eyes: Normal extra ocular motion bilaterally.      Assessment     1. Sore throat          Plan    Problem List Items Addressed This Visit    None  Visit Diagnoses         Sore throat    -  Primary    Relevant Orders    POCT RAPID STREP A          Rapid Strep negative. She will take a home COVID test. If COVID also negative, will send abx.     2:45 PM  Home COVID negative. Rx for augmentin sent to pharmacy. Also requesting something for pain. Rx for naproxen sent. All questions answered.